# Patient Record
Sex: MALE | Race: OTHER | NOT HISPANIC OR LATINO | URBAN - METROPOLITAN AREA
[De-identification: names, ages, dates, MRNs, and addresses within clinical notes are randomized per-mention and may not be internally consistent; named-entity substitution may affect disease eponyms.]

---

## 2024-08-26 ENCOUNTER — INPATIENT (INPATIENT)
Facility: HOSPITAL | Age: 74
LOS: 1 days | Discharge: HOME | DRG: 66 | End: 2024-08-28
Attending: STUDENT IN AN ORGANIZED HEALTH CARE EDUCATION/TRAINING PROGRAM | Admitting: STUDENT IN AN ORGANIZED HEALTH CARE EDUCATION/TRAINING PROGRAM
Payer: SELF-PAY

## 2024-08-26 VITALS
SYSTOLIC BLOOD PRESSURE: 184 MMHG | HEART RATE: 80 BPM | RESPIRATION RATE: 18 BRPM | WEIGHT: 171.96 LBS | DIASTOLIC BLOOD PRESSURE: 103 MMHG | OXYGEN SATURATION: 100 % | HEIGHT: 64 IN | TEMPERATURE: 98 F

## 2024-08-26 DIAGNOSIS — I20.9 ANGINA PECTORIS, UNSPECIFIED: ICD-10-CM

## 2024-08-26 LAB
ALBUMIN SERPL ELPH-MCNC: 3.6 G/DL — SIGNIFICANT CHANGE UP (ref 3.5–5)
ALP SERPL-CCNC: 78 U/L — SIGNIFICANT CHANGE UP (ref 40–120)
ALT FLD-CCNC: 60 U/L DA — SIGNIFICANT CHANGE UP (ref 10–60)
ANION GAP SERPL CALC-SCNC: 6 MMOL/L — SIGNIFICANT CHANGE UP (ref 5–17)
AST SERPL-CCNC: 34 U/L — SIGNIFICANT CHANGE UP (ref 10–40)
BASE EXCESS BLDV CALC-SCNC: 2.8 MMOL/L — SIGNIFICANT CHANGE UP
BASOPHILS # BLD AUTO: 0.03 K/UL — SIGNIFICANT CHANGE UP (ref 0–0.2)
BASOPHILS NFR BLD AUTO: 0.6 % — SIGNIFICANT CHANGE UP (ref 0–2)
BILIRUB SERPL-MCNC: 0.6 MG/DL — SIGNIFICANT CHANGE UP (ref 0.2–1.2)
BUN SERPL-MCNC: 21 MG/DL — HIGH (ref 7–18)
CA-I SERPL-SCNC: SIGNIFICANT CHANGE UP MMOL/L (ref 1.15–1.33)
CALCIUM SERPL-MCNC: 8.5 MG/DL — SIGNIFICANT CHANGE UP (ref 8.4–10.5)
CHLORIDE SERPL-SCNC: 105 MMOL/L — SIGNIFICANT CHANGE UP (ref 96–108)
CO2 SERPL-SCNC: 26 MMOL/L — SIGNIFICANT CHANGE UP (ref 22–31)
CREAT SERPL-MCNC: 1.44 MG/DL — HIGH (ref 0.5–1.3)
EGFR: 51 ML/MIN/1.73M2 — LOW
EOSINOPHIL # BLD AUTO: 0.13 K/UL — SIGNIFICANT CHANGE UP (ref 0–0.5)
EOSINOPHIL NFR BLD AUTO: 2.5 % — SIGNIFICANT CHANGE UP (ref 0–6)
GAS PNL BLDV: 136 MMOL/L — SIGNIFICANT CHANGE UP (ref 136–145)
GAS PNL BLDV: SIGNIFICANT CHANGE UP
GLUCOSE SERPL-MCNC: 156 MG/DL — HIGH (ref 70–99)
HCO3 BLDV-SCNC: 29 MMOL/L — SIGNIFICANT CHANGE UP (ref 22–29)
HCT VFR BLD CALC: 36.3 % — LOW (ref 39–50)
HGB BLD-MCNC: 11.8 G/DL — LOW (ref 13–17)
IMM GRANULOCYTES NFR BLD AUTO: 0.2 % — SIGNIFICANT CHANGE UP (ref 0–0.9)
LACTATE BLDV-MCNC: 1.2 MMOL/L — SIGNIFICANT CHANGE UP (ref 0.5–2)
LIDOCAIN IGE QN: 47 U/L — SIGNIFICANT CHANGE UP (ref 13–75)
LYMPHOCYTES # BLD AUTO: 1.38 K/UL — SIGNIFICANT CHANGE UP (ref 1–3.3)
LYMPHOCYTES # BLD AUTO: 26.5 % — SIGNIFICANT CHANGE UP (ref 13–44)
MAGNESIUM SERPL-MCNC: 1.5 MG/DL — LOW (ref 1.6–2.6)
MCHC RBC-ENTMCNC: 27.7 PG — SIGNIFICANT CHANGE UP (ref 27–34)
MCHC RBC-ENTMCNC: 32.5 GM/DL — SIGNIFICANT CHANGE UP (ref 32–36)
MCV RBC AUTO: 85.2 FL — SIGNIFICANT CHANGE UP (ref 80–100)
MONOCYTES # BLD AUTO: 0.51 K/UL — SIGNIFICANT CHANGE UP (ref 0–0.9)
MONOCYTES NFR BLD AUTO: 9.8 % — SIGNIFICANT CHANGE UP (ref 2–14)
NEUTROPHILS # BLD AUTO: 3.14 K/UL — SIGNIFICANT CHANGE UP (ref 1.8–7.4)
NEUTROPHILS NFR BLD AUTO: 60.4 % — SIGNIFICANT CHANGE UP (ref 43–77)
NRBC # BLD: 0 /100 WBCS — SIGNIFICANT CHANGE UP (ref 0–0)
NT-PROBNP SERPL-SCNC: 31 PG/ML — SIGNIFICANT CHANGE UP (ref 0–125)
PCO2 BLDV: 52 MMHG — SIGNIFICANT CHANGE UP (ref 42–55)
PH BLDV: 7.36 — SIGNIFICANT CHANGE UP (ref 7.32–7.43)
PLATELET # BLD AUTO: 173 K/UL — SIGNIFICANT CHANGE UP (ref 150–400)
PO2 BLDV: 26 MMHG — SIGNIFICANT CHANGE UP
POTASSIUM BLDV-SCNC: 4.6 MMOL/L — SIGNIFICANT CHANGE UP (ref 3.5–5.1)
POTASSIUM SERPL-MCNC: 4.2 MMOL/L — SIGNIFICANT CHANGE UP (ref 3.5–5.3)
POTASSIUM SERPL-SCNC: 4.2 MMOL/L — SIGNIFICANT CHANGE UP (ref 3.5–5.3)
PROT SERPL-MCNC: 7.4 G/DL — SIGNIFICANT CHANGE UP (ref 6–8.3)
RBC # BLD: 4.26 M/UL — SIGNIFICANT CHANGE UP (ref 4.2–5.8)
RBC # FLD: 13 % — SIGNIFICANT CHANGE UP (ref 10.3–14.5)
SAO2 % BLDV: 41.8 % — SIGNIFICANT CHANGE UP
SODIUM SERPL-SCNC: 137 MMOL/L — SIGNIFICANT CHANGE UP (ref 135–145)
TROPONIN I, HIGH SENSITIVITY RESULT: 6 NG/L — SIGNIFICANT CHANGE UP
TROPONIN I, HIGH SENSITIVITY RESULT: 8.2 NG/L — SIGNIFICANT CHANGE UP
WBC # BLD: 5.2 K/UL — SIGNIFICANT CHANGE UP (ref 3.8–10.5)
WBC # FLD AUTO: 5.2 K/UL — SIGNIFICANT CHANGE UP (ref 3.8–10.5)

## 2024-08-26 PROCEDURE — 70547 MR ANGIOGRAPHY NECK W/O DYE: CPT | Mod: 26

## 2024-08-26 PROCEDURE — 71045 X-RAY EXAM CHEST 1 VIEW: CPT | Mod: 26

## 2024-08-26 PROCEDURE — 70551 MRI BRAIN STEM W/O DYE: CPT | Mod: 26

## 2024-08-26 PROCEDURE — 99223 1ST HOSP IP/OBS HIGH 75: CPT | Mod: GC

## 2024-08-26 PROCEDURE — 99285 EMERGENCY DEPT VISIT HI MDM: CPT

## 2024-08-26 PROCEDURE — 70450 CT HEAD/BRAIN W/O DYE: CPT | Mod: 26

## 2024-08-26 PROCEDURE — 93010 ELECTROCARDIOGRAM REPORT: CPT

## 2024-08-26 PROCEDURE — 74174 CTA ABD&PLVS W/CONTRAST: CPT | Mod: 26,MC

## 2024-08-26 PROCEDURE — 70544 MR ANGIOGRAPHY HEAD W/O DYE: CPT | Mod: 26,59

## 2024-08-26 PROCEDURE — 71275 CT ANGIOGRAPHY CHEST: CPT | Mod: 26,MC

## 2024-08-26 RX ORDER — ASPIRIN 81 MG
162 TABLET, DELAYED RELEASE (ENTERIC COATED) ORAL ONCE
Refills: 0 | Status: COMPLETED | OUTPATIENT
Start: 2024-08-26 | End: 2024-08-26

## 2024-08-26 RX ORDER — ASPIRIN 81 MG
81 TABLET, DELAYED RELEASE (ENTERIC COATED) ORAL DAILY
Refills: 0 | Status: DISCONTINUED | OUTPATIENT
Start: 2024-08-27 | End: 2024-08-28

## 2024-08-26 RX ORDER — HYDRALAZINE HCL 50 MG
10 TABLET ORAL ONCE
Refills: 0 | Status: COMPLETED | OUTPATIENT
Start: 2024-08-26 | End: 2024-08-26

## 2024-08-26 RX ADMIN — Medication 162 MILLIGRAM(S): at 13:31

## 2024-08-26 RX ADMIN — Medication 10 MILLIGRAM(S): at 21:58

## 2024-08-26 NOTE — H&P ADULT - ASSESSMENT
Patient is a 74M with PMHx of HTN, DM, GERD, hip arthritis who presents with 3 month history of chest pain on exertion. Patient noted to have new left sided facial droop in the ED. NIHSS 1. MR of head showed punctate focus in left corona radiata, consistent with an acute infarct. Admitted for acute CVA workup.

## 2024-08-26 NOTE — H&P ADULT - PROBLEM SELECTOR PLAN 2
p/w left sided chest pain with nausea   Reports chest pain on exertion for the past 3 months; chest pain last 2-3 months   trop neg x2; EKG NSR,   f/u TTE  Not a candidate for stress test or PCI for 3 months 2/2 acute stroke

## 2024-08-26 NOTE — H&P ADULT - NSHPREVIEWOFSYSTEMS_GEN_ALL_CORE
CONSTITUTIONAL: No fever, weight loss, or fatigue  EYES: No eye pain, visual disturbances, or discharge  ENT:  No difficulty hearing, tinnitus, vertigo; No sinus or throat pain  NECK: No pain or stiffness  RESPIRATORY: No cough, wheezing, chills or hemoptysis; No Shortness of Breath  CARDIOVASCULAR: No chest pain, palpitations, passing out, dizziness, or leg swelling  GASTROINTESTINAL: No abdominal or epigastric pain. No nausea, vomiting, or hematemesis; No diarrhea or constipation. No melena or hematochezia.  GENITOURINARY: No dysuria, frequency, hematuria, or incontinence  NEUROLOGICAL: No headaches, memory loss, loss of strength, numbness, or tremors  SKIN: No itching, burning, rashes, or lesions   LYMPH Nodes: No enlarged glands  ENDOCRINE: No heat or cold intolerance; No hair loss  MUSCULOSKELETAL: No joint pain or swelling; No muscle, back, or extremity pain  PSYCHIATRIC: No depression, anxiety, mood swings, or difficulty sleeping  HEME/LYMPH: No easy bruising, or bleeding gums  ALLERGY AND IMMUNOLOGIC: No hives or eczema CONSTITUTIONAL: No fever, weight loss, or fatigue  EYES: No eye pain, visual disturbances, or discharge  ENT:  No difficulty hearing, tinnitus, vertigo; No sinus or throat pain  NECK: No pain or stiffness  RESPIRATORY: No cough, wheezing, chills or hemoptysis; No Shortness of Breath  CARDIOVASCULAR: +chest pain, palpitation, dizziness; No passing out or leg swelling  GASTROINTESTINAL: +constipation; No abdominal or epigastric pain. No nausea, vomiting, or hematemesis; No diarrhea; No melena or hematochezia.  GENITOURINARY: No dysuria, frequency, hematuria, or incontinence  NEUROLOGICAL: + headache; No headaches, memory loss, loss of strength, numbness, or tremors  SKIN: No itching, burning, rashes, or lesions   LYMPH Nodes: No enlarged glands  MUSCULOSKELETAL: No joint pain or swelling; No muscle, back, or extremity pain  PSYCHIATRIC: No depression, anxiety, mood swings, or difficulty sleeping  HEME/LYMPH: No easy bruising, or bleeding gums  ALLERGY AND IMMUNOLOGIC: No hives or eczema

## 2024-08-26 NOTE — H&P ADULT - ATTENDING COMMENTS
CT Head IMPRESSION:  No intracranial mass effect, acute hemorrhage or midline shift. Punctate focus of ischemia seen on the MRI of the brain from the same day, in the left corona radiata, is not appreciated on this noncontrast CT.    MRA Head/Neck, MRI Brain IMPRESSION:  Punctate focus of restricted diffusion in the left corona radiata with associated T2 prolongation, consistent with an acute infarct. There is no acute intracranial hemorrhage.  No hemodynamically significant stenosis in the head or neck.  No intracranial aneurysm.    CTA C/A/P IMPRESSION:  No CT evidence of aortic intramural hematoma, dissection, aneurysm, pseudoaneurysm, or transection.  No acute pleural or parenchymal abnormality.  No evidence of abdominal finding.    Chest X-Ray IMPRESSION: Negative chest.    EKG: Normal Sinus Rhythm, 65 bpm, QTc 382ms, NJ 134ms, on my read no ST segment elevation or depression, no TWI    74 year old male patient with pmhx HTN, DM, GERD, Hip arthritis who presented to the ER due to worsening chest pain.  In the ER patient found to have a left sided facial droop with slurred speech. MRI/MRA found an acute stroke. Patient states hi slurred speech has improved, but he still has a left sided facial droop. The patient states he came to [place] last Saturday from [facility]. He has been having chest tightness on and off with associated shortness of breath for the past 3 months worsened last night. The chest tightness lasts 2 to 3 hours at a time and is worse with taking a deep breath. He denies ever having had a stress test, but he thinks he had an ECHO 9 months ago which he thinks was negative.  Review Of Systems included: + chest pain/tightness, + chest pain/tightness on exertion, + shortness of breath, + toe numbness of all 10 toes for 6 weeks, + slurred speech (improved), + left facial droop, + mild dizziness, + imbalance, + headache, + dysuria, + throat tickles, + dry cough, no fever, no chills, no toe coldness, no falls, no loss of consciousness, no lightheadedness, no palpitations, no heartburn, no blurry vision, no facial numbness    General: Awake, Alert, Oriented   Cardiac: RRR, chest non-tender on palpation  Pulmonary: CTA b/l  Abdominal: Soft, ND, NT  Neuro: CN II-VI and VIII-XII intact except for left facial droop, Muscle strength +5/5 in upper and lower extremities b/l, Sensation intact except for decreased sensation in all toes bilaterally which patient states is chronic for 6 weeks, NIHSS of 1 (left facial droop 1 pt)  Extremities: No edema b/l    # Stroke  > NIHSS of 1  - Consult Neurology  - Cardiac Telemetry Monitoring  - ECHO  - Lipid Panel  - A1C  - Aspirin  - Plavix  - Atorvastatin  - Neuro-checks  - Permissive Hypertension for 24-48 hours    # Chest Pain  - Cardiac Telemetry Monitoring  - Consult Cardiology  - ECHO  - A1C  - Lipid Panel  - Aspirin  - Statin  - Follow up repeat Troponin  - Tylenol prn for pain    # Hx of DM  - Insulin Sliding Scale  - Blood Glucose Monitoring  - Can monitor blood glucose for 24 hours before starting long acting insulin if needed    # Hx of HTN, GERD  - Continue home medication PPI  - Hold antihypertensive medications for permissive hypertension    # DVT PPx: Lovenox    # FEN  - Diabetic DASH Diet  - Monitor and replete electrolytes as needed    Patient case and management was discussed with ER Attending. I did examine all labs (including CBC, Troponin, CMP, Lipase, Mg, pro-BNP, VBG), imaging, prior notes CT Head IMPRESSION:  No intracranial mass effect, acute hemorrhage or midline shift. Punctate focus of ischemia seen on the MRI of the brain from the same day, in the left corona radiata, is not appreciated on this noncontrast CT.    MRA Head/Neck, MRI Brain IMPRESSION:  Punctate focus of restricted diffusion in the left corona radiata with associated T2 prolongation, consistent with an acute infarct. There is no acute intracranial hemorrhage.  No hemodynamically significant stenosis in the head or neck.  No intracranial aneurysm.    CTA C/A/P IMPRESSION:  No CT evidence of aortic intramural hematoma, dissection, aneurysm, pseudoaneurysm, or transection.  No acute pleural or parenchymal abnormality.  No evidence of abdominal finding.    Chest X-Ray IMPRESSION: Negative chest.    EKG: Normal Sinus Rhythm, 65 bpm, QTc 382ms, SD 134ms, on my read no ST segment elevation or depression, no TWI    74 year old male patient with pmhx HTN, DM, GERD, Hip arthritis who presented to the ER due to worsening chest pain.  In the ER patient found to have a left sided facial droop with slurred speech. MRI/MRA found an acute stroke. Patient states hi slurred speech has improved, but he still has a left sided facial droop. The patient states he came to [place] last Saturday from [facility]. He has been having chest tightness on and off with associated shortness of breath for the past 3 months worsened last night. The chest tightness lasts 2 to 3 hours at a time and is worse with taking a deep breath. He denies ever having had a stress test, but he thinks he had an ECHO 9 months ago which he thinks was negative.  Review Of Systems included: + chest pain/tightness, + chest pain/tightness on exertion, + shortness of breath, + toe numbness of all 10 toes for 6 weeks, + slurred speech (improved), + left facial droop, + mild dizziness, + imbalance, + headache, + dysuria, + throat tickles, + dry cough, no fever, no chills, no toe coldness, no falls, no loss of consciousness, no lightheadedness, no palpitations, no heartburn, no blurry vision, no facial numbness    General: Awake, Alert, Oriented   Cardiac: RRR, chest non-tender on palpation  Pulmonary: CTA b/l  Abdominal: Soft, ND, NT  Neuro: CN II-VI and VIII-XII intact except for left facial droop, Muscle strength +5/5 in upper and lower extremities b/l, Sensation intact except for decreased sensation in all toes bilaterally which patient states is chronic for 6 weeks, NIHSS of 1 (left facial droop 1 pt)  Extremities: No edema b/l    # Stroke  > NIHSS of 1  - Consult Neurology  - Cardiac Telemetry Monitoring  - ECHO  - Lipid Panel  - A1C  - Aspirin  - Plavix  - Atorvastatin  - Neuro-checks  - Permissive Hypertension for 24-48 hours    # Chest Pain  - Cardiac Telemetry Monitoring  - Consult Cardiology  - ECHO  - A1C  - Lipid Panel  - Aspirin  - Statin  - Follow up repeat Troponin  - Tylenol prn for pain    # Hx of DM  - Insulin Sliding Scale  - Blood Glucose Monitoring  - Can monitor blood glucose for 24 hours before starting long acting insulin if needed    # Hx of HTN, GERD  - Continue home medication PPI  - Hold antihypertensive medications for permissive hypertension    # DVT PPx: Lovenox    # FEN  - Diabetic DASH Diet  - Monitor and replete electrolytes as needed    Patient case and management was discussed with ER Attending. I did examine all labs (including CBC, Troponin, CMP, Lipase, Mg, pro-BNP, VBG), imaging, prior notes    Time-based billing (NON-critical care).  76 minutes spent on total encounter excluding any time spent teaching residents. The necessity of the time spent during the encounter on this date of service was due to: Review of records, vital signs, labs, microbiology, and imaging, Ordering labs and/or tests, General physical examination performed, Generation of treatment plan, Counseling of the patient and/or family members CT Head IMPRESSION:  No intracranial mass effect, acute hemorrhage or midline shift. Punctate focus of ischemia seen on the MRI of the brain from the same day, in the left corona radiata, is not appreciated on this noncontrast CT.    MRA Head/Neck, MRI Brain IMPRESSION:  Punctate focus of restricted diffusion in the left corona radiata with associated T2 prolongation, consistent with an acute infarct. There is no acute intracranial hemorrhage.  No hemodynamically significant stenosis in the head or neck.  No intracranial aneurysm.    CTA C/A/P IMPRESSION:  No CT evidence of aortic intramural hematoma, dissection, aneurysm, pseudoaneurysm, or transection.  No acute pleural or parenchymal abnormality.  No evidence of abdominal finding.    Chest X-Ray IMPRESSION: Negative chest.    EKG: Normal Sinus Rhythm, 65 bpm, QTc 382ms, VT 134ms, on my read no ST segment elevation or depression, no TWI    74 year old male patient with pmhx HTN, DM, GERD, Hip arthritis who presented to the ER due to worsening chest pain.  In the ER patient found to have a left sided facial droop with slurred speech. MRI/MRA found an acute stroke. Patient states hi slurred speech has improved, but he still has a left sided facial droop. The patient states he came to [place] last Saturday from [facility]. He has been having chest tightness on and off with associated shortness of breath for the past 3 months worsened last night. The chest tightness lasts 2 to 3 hours at a time and is worse with taking a deep breath. He denies ever having had a stress test, but he thinks he had an ECHO 9 months ago which he thinks was negative.  Review Of Systems included: + chest pain/tightness, + chest pain/tightness on exertion, + shortness of breath, + toe numbness of all 10 toes for 6 weeks, + slurred speech (improved), + left facial droop, + mild dizziness, + imbalance, + headache, + dysuria, + throat tickles, + dry cough, no fever, no chills, no toe coldness, no falls, no loss of consciousness, no lightheadedness, no palpitations, no heartburn, no blurry vision, no facial numbness    General: Awake, Alert, Oriented   Cardiac: RRR, chest non-tender on palpation  Pulmonary: CTA b/l  Abdominal: Soft, ND, NT  Neuro: CN II-VI and VIII-XII intact except for left facial droop, Muscle strength +5/5 in upper and lower extremities b/l, Sensation intact except for decreased sensation in all toes bilaterally which patient states is chronic for 6 weeks, NIHSS of 1 (left facial droop 1 pt)  Extremities: No edema b/l    # Stroke  > NIHSS of 1  - Consult Neurology  - Cardiac Telemetry Monitoring  - ECHO  - Lipid Panel  - A1C  - Aspirin  - Plavix  - Atorvastatin  - Neuro-checks  - Permissive Hypertension for 24-48 hours    # Chest Pain  > Wells Score of zero; PE unlikely  - Cardiac Telemetry Monitoring  - Consult Cardiology  - ECHO  - A1C  - Lipid Panel  - Aspirin  - Statin  - Follow up repeat Troponin  - Tylenol prn for pain    # Hx of DM  - Insulin Sliding Scale  - Blood Glucose Monitoring  - Can monitor blood glucose for 24 hours before starting long acting insulin if needed    # Hx of HTN, GERD  - Continue home medication PPI  - Hold antihypertensive medications for permissive hypertension    # DVT PPx: Lovenox    # FEN  - Diabetic DASH Diet  - Monitor and replete electrolytes as needed    Patient case and management was discussed with ER Attending. I did examine all labs (including CBC, Troponin, CMP, Lipase, Mg, pro-BNP, VBG), imaging, prior notes    Time-based billing (NON-critical care).  76 minutes spent on total encounter excluding any time spent teaching residents. The necessity of the time spent during the encounter on this date of service was due to: Review of records, vital signs, labs, microbiology, and imaging, Ordering labs and/or tests, General physical examination performed, Generation of treatment plan, Counseling of the patient and/or family members

## 2024-08-26 NOTE — ED PROVIDER NOTE - PHYSICAL EXAMINATION
General: non-toxic, uncomfortable  HEENT: NCAT, PERRL, no conjunctival pallor   Cardiac: RRR, no murmurs, 2+ peripheral pulses  Resp: CTAB  Abdomen: soft, non-distended, bowel sounds present, no ttp, no rebound or guarding. no organomegaly  Extremities: no peripheral edema, calf tenderness, or leg size discrepancies  Skin: no rashes, no diaphoresis  Neuro: AAOx4, 5+motor, sensation grossly intact  Psych: mood and affect appropriate

## 2024-08-26 NOTE — H&P ADULT - HISTORY OF PRESENT ILLNESS
Patient is a 74M with PMHx of HTN, DM, GERD, hip arthritis who presents with 3 month history of chest pain on exertion. Patient is visiting his daughter for [facility] and arrived to [place] last Saturday. Patient has been having intermittent chest tightness with exertion for the past 3 months. The episodes last 1-3 hours and improves with rest. During these episodes, patient reports palpitations and dizziness but denies SOB, arm pain or numbness. Patient denies pleuritic chest pain, and is not tender to touch. Patient has never sustained a fall or syncopal event. Six weeks ago patient also started having numbness on all 10 of his toes and was told by his PCP that it was likely 2/2 his diabetes. Patient has not seen a cardiologist for the chest pain. He regularly gets an EKG and his last TTE 9 months ago was normal. Patient never had a stress test before. 3 days ago he had an episode of R. neck numbness and pain. Patient denies nausea, vomiting, fever, chills, abdominal pain, shooting pain down the leg.     While in the ED, patient was noted to have a new left facial droop. Per the daughter, patient's speech was unclear at the time. Patient denies facial numbness or enunciating difficulties but did endorse feeling off balance when trying to walk. Speech has improved to near baseline at the time of exam. Patient endorses headache but denies any neurological symptoms including arm or leg weakness, vision changes, focal numbness. Patient reports that he was on cholesterol medication but his recent cholesterol was 174 so he has been intermittently taking his meds. Last A1C was 6.7. Patient denies any family history of stroke or MI.

## 2024-08-26 NOTE — ED PROVIDER NOTE - CLINICAL SUMMARY MEDICAL DECISION MAKING FREE TEXT BOX
74-year-old male with history of hyperlipidemia, non-insulin-dependent diabetes presents after a flight from [facility] for substernal chest pain increased with deep breathing and walking with associated shortness of breath.  No associated fever, cough, vomiting, leg swelling orthopnea.  Pain is not radicular to the back jaw or arms.  Feels different from his typical GERD.  On exam no conjunctival pallor, clear lungs, no evidence of DVT, benign abdomen.  Concerning presentation will need ACS workup, PE rule out given pleurisy and onset after a long flight.  Initial EKG with normal sinus rhythm T wave flattening in leads III and V3.  No prior EKGs for comparison.  Patient advised to advise ED team if pain worsens for serial ECGs.  Aspirin and admission.

## 2024-08-26 NOTE — ED ADULT NURSE NOTE - CHIEF COMPLAINT QUOTE
Mid sternal chest pain and sob on and off x 3 months ,increased pain since last night ,also reports all toes numbness x 3 months ,just arrived from [facility] 2 days ago

## 2024-08-26 NOTE — CHART NOTE - NSCHARTNOTEFT_GEN_A_CORE
Called for admission.  Patient has left sided weakness and left facial droop  Received IV contrast earlier today, cannot receive further contrast for stroke.,  Discussed with MRI, can go for urgent MRI.  Consent form signed, to go for MRA head and neck with MR head  Admission to medicine is contingent on findings Called for admission.  Patient has left sided weakness and left facial droop  Received IV contrast earlier today, cannot receive further contrast for stroke.,  Discussed with MRI, can go for urgent MRI.  Consent form signed, to go for MRA head and neck with MR head  Admission to medicine is contingent on findings    MR with acute infarct, no significant stenosis  admit to telemetry

## 2024-08-26 NOTE — ED ADULT NURSE NOTE - OBJECTIVE STATEMENT
Patient c/o worsening non-radiating chest pain and tingling of bilateral feet. Pt denies any dizziness, sob, fever/chills and n/v/d.

## 2024-08-26 NOTE — H&P ADULT - NSHPPHYSICALEXAM_GEN_ALL_CORE
Vital Signs Last 24 Hrs  T(C): 36.6 (26 Aug 2024 21:30), Max: 36.9 (26 Aug 2024 11:43)  T(F): 97.8 (26 Aug 2024 21:30), Max: 98.5 (26 Aug 2024 11:43)  HR: 66 (26 Aug 2024 21:30) (64 - 80)  BP: 157/81 (26 Aug 2024 21:30) (157/81 - 184/103)  BP(mean): --  RR: 18 (26 Aug 2024 21:30) (18 - 20)  SpO2: 99% (26 Aug 2024 21:30) (99% - 100%)    Parameters below as of 26 Aug 2024 21:30  Patient On (Oxygen Delivery Method): room air    GENERAL: NAD, well-groomed, well-developed  HEAD:  Atraumatic, Normocephalic  EYES: EOMI, PERRLA, conjunctiva and sclera clear  ENMT: No tonsillar erythema, exudates, or enlargement; Moist mucous membranes, No lesions  NECK: Supple, normal appearance, No JVD; Normal thyroid; Trachea midline  NERVOUS SYSTEM:  Alert & Oriented X3,  Motor Strength 5/5 B/L upper and lower extremities, sensation intact, CN II-XII intact.   CHEST/LUNG: Lungs clear to auscultation bilaterally, No rales, rhonchi, wheezing   HEART: Regular rate and rhythm; No murmurs, rubs, or gallops  ABDOMEN: Soft, Nontender, Nondistended; Bowel sounds present  : No suprapubic tenderness, CVAT; Fragoso (+/-)  EXTREMITIES:  2+ Peripheral Pulses, No clubbing, cyanosis, or edema  LYMPH: No lymphadenopathy noted  SKIN: No rashes or lesions;  Good capillary refill Vital Signs Last 24 Hrs  T(C): 36.6 (26 Aug 2024 21:30), Max: 36.9 (26 Aug 2024 11:43)  T(F): 97.8 (26 Aug 2024 21:30), Max: 98.5 (26 Aug 2024 11:43)  HR: 66 (26 Aug 2024 21:30) (64 - 80)  BP: 157/81 (26 Aug 2024 21:30) (157/81 - 184/103)  BP(mean): --  RR: 18 (26 Aug 2024 21:30) (18 - 20)  SpO2: 99% (26 Aug 2024 21:30) (99% - 100%)    Parameters below as of 26 Aug 2024 21:30  Patient On (Oxygen Delivery Method): room air    GENERAL: NAD, laying comfortably in bed   HEAD:  Atraumatic, Normocephalic  EYES: EOMI, PERRLA, conjunctiva and sclera clear  ENMT: No tonsillar erythema, exudates, or enlargement; Moist mucous membranes, No lesions  NECK: Supple, normal appearance, No JVD; Normal thyroid; Trachea midline  NERVOUS SYSTEM:  Alert & Oriented X3,  Motor Strength 5/5 B/L upper and lower extremities, sensation intact, CN II-VI, VIII-XII intact. Left facial droop, No upper facial motor deficit,   CHEST/LUNG: Lungs clear to auscultation bilaterally, No rales, rhonchi, wheezing   HEART: Regular rate and rhythm; LUSB systolic murmur; No rubs, or gallops  ABDOMEN: Soft, Nontender, Nondistended; Bowel sounds present  : No suprapubic tenderness, CVAT;  EXTREMITIES:  2+ Peripheral Pulses, No clubbing, cyanosis, or edema  LYMPH: No lymphadenopathy noted  SKIN: No rashes or lesions;  Good capillary refill

## 2024-08-26 NOTE — H&P ADULT - PROBLEM SELECTOR PLAN 1
MR of head: Punctate focus in left corona radiata, consistent with an acute infarct. No acute intracranial hemorrhage.  EKG NSR  s/p asprin 162mg in ED   Admit to tele   Passed dysphagia   c/w aspirin 81mg, plavix 75mg atorvastatin 80mg   f/u A1C, lipid panel, TTE w/ bubble  Neurology Dr. Carranza consulted  f/u PT eval

## 2024-08-26 NOTE — H&P ADULT - PROBLEM SELECTOR PLAN 4
Hx of T2DM on metformin 500mg TID w/ peripheral neuropathy   Holding oral DM meds   will start low dose ISS AC QHS   f/u A1C

## 2024-08-26 NOTE — H&P ADULT - PROBLEM SELECTOR PLAN 3
Hx of HTN reports ramipril 5mg qd PRN  Reports taking 5mg as needed, usually doesn't take it every day as patient gets hypotensive (dizziness)  c/w home meds Hx of HTN reports ramipril 5mg qd PRN  Reports taking 5mg as needed, usually doesn't take it every day as patient gets hypotensive (dizziness)  Permissive HTN for 24-28h

## 2024-08-26 NOTE — ED PROVIDER NOTE - PROGRESS NOTE DETAILS
Shaina Espino (Rodriguez),  called to bedside for concern of new facial droop. mild downward deviation of mouth, not previously present. no pronator drift. extremities still with 5/5 strength. given cp and new neuro finding with pleurisy will change imaging to aorta study and expedite. Shaina Espino DO (Rodriguez) accepted to hosp service requesting. [facility] Shaina Espino (Rodriguez), DO received call from rads that CTH will have residual contrast from angio, and likely will not be a useful study. hospitalist requesting admission be reversed at this time and hold for MRI. attempting to reach radiology regarding best next step. pt still with L facial droop, talking with full strength. Shaina Espino (Rodriguez), DO infarct not amenable to thrombectomy. accepted to hospitalist service. pending callback from neuro. Shaina Espino (Rodriguez), DO neuro consulted. recommending plavix and BP within normal limits. will give hydralazine to lower from the current 157, per neuro recommendations since this is in an area of concern for hypertensive stroke.

## 2024-08-27 DIAGNOSIS — Z98.890 OTHER SPECIFIED POSTPROCEDURAL STATES: Chronic | ICD-10-CM

## 2024-08-27 DIAGNOSIS — Z29.9 ENCOUNTER FOR PROPHYLACTIC MEASURES, UNSPECIFIED: ICD-10-CM

## 2024-08-27 DIAGNOSIS — R07.9 CHEST PAIN, UNSPECIFIED: ICD-10-CM

## 2024-08-27 DIAGNOSIS — K21.9 GASTRO-ESOPHAGEAL REFLUX DISEASE WITHOUT ESOPHAGITIS: ICD-10-CM

## 2024-08-27 DIAGNOSIS — I10 ESSENTIAL (PRIMARY) HYPERTENSION: ICD-10-CM

## 2024-08-27 DIAGNOSIS — I63.9 CEREBRAL INFARCTION, UNSPECIFIED: ICD-10-CM

## 2024-08-27 DIAGNOSIS — E11.9 TYPE 2 DIABETES MELLITUS WITHOUT COMPLICATIONS: ICD-10-CM

## 2024-08-27 LAB
A1C WITH ESTIMATED AVERAGE GLUCOSE RESULT: 7.4 % — HIGH (ref 4–5.6)
ANION GAP SERPL CALC-SCNC: 7 MMOL/L — SIGNIFICANT CHANGE UP (ref 5–17)
BUN SERPL-MCNC: 22 MG/DL — HIGH (ref 7–18)
CALCIUM SERPL-MCNC: 9.6 MG/DL — SIGNIFICANT CHANGE UP (ref 8.4–10.5)
CHLORIDE SERPL-SCNC: 105 MMOL/L — SIGNIFICANT CHANGE UP (ref 96–108)
CHOLEST SERPL-MCNC: 160 MG/DL — SIGNIFICANT CHANGE UP
CO2 SERPL-SCNC: 27 MMOL/L — SIGNIFICANT CHANGE UP (ref 22–31)
CREAT SERPL-MCNC: 1.37 MG/DL — HIGH (ref 0.5–1.3)
EGFR: 54 ML/MIN/1.73M2 — LOW
ESTIMATED AVERAGE GLUCOSE: 166 MG/DL — HIGH (ref 68–114)
GLUCOSE BLDC GLUCOMTR-MCNC: 130 MG/DL — HIGH (ref 70–99)
GLUCOSE BLDC GLUCOMTR-MCNC: 137 MG/DL — HIGH (ref 70–99)
GLUCOSE BLDC GLUCOMTR-MCNC: 140 MG/DL — HIGH (ref 70–99)
GLUCOSE BLDC GLUCOMTR-MCNC: 152 MG/DL — HIGH (ref 70–99)
GLUCOSE BLDC GLUCOMTR-MCNC: 169 MG/DL — HIGH (ref 70–99)
GLUCOSE SERPL-MCNC: 148 MG/DL — HIGH (ref 70–99)
HCT VFR BLD CALC: 37.1 % — LOW (ref 39–50)
HDLC SERPL-MCNC: 49 MG/DL — SIGNIFICANT CHANGE UP
HGB BLD-MCNC: 12.1 G/DL — LOW (ref 13–17)
LIPID PNL WITH DIRECT LDL SERPL: 89 MG/DL — SIGNIFICANT CHANGE UP
MAGNESIUM SERPL-MCNC: 1.8 MG/DL — SIGNIFICANT CHANGE UP (ref 1.6–2.6)
MCHC RBC-ENTMCNC: 27.9 PG — SIGNIFICANT CHANGE UP (ref 27–34)
MCHC RBC-ENTMCNC: 32.6 GM/DL — SIGNIFICANT CHANGE UP (ref 32–36)
MCV RBC AUTO: 85.7 FL — SIGNIFICANT CHANGE UP (ref 80–100)
NON HDL CHOLESTEROL: 111 MG/DL — SIGNIFICANT CHANGE UP
NRBC # BLD: 0 /100 WBCS — SIGNIFICANT CHANGE UP (ref 0–0)
PHOSPHATE SERPL-MCNC: 3.2 MG/DL — SIGNIFICANT CHANGE UP (ref 2.5–4.5)
PLATELET # BLD AUTO: 173 K/UL — SIGNIFICANT CHANGE UP (ref 150–400)
POTASSIUM SERPL-MCNC: 4.4 MMOL/L — SIGNIFICANT CHANGE UP (ref 3.5–5.3)
POTASSIUM SERPL-SCNC: 4.4 MMOL/L — SIGNIFICANT CHANGE UP (ref 3.5–5.3)
RBC # BLD: 4.33 M/UL — SIGNIFICANT CHANGE UP (ref 4.2–5.8)
RBC # FLD: 13.2 % — SIGNIFICANT CHANGE UP (ref 10.3–14.5)
SODIUM SERPL-SCNC: 139 MMOL/L — SIGNIFICANT CHANGE UP (ref 135–145)
TRIGL SERPL-MCNC: 111 MG/DL — SIGNIFICANT CHANGE UP
WBC # BLD: 4.93 K/UL — SIGNIFICANT CHANGE UP (ref 3.8–10.5)
WBC # FLD AUTO: 4.93 K/UL — SIGNIFICANT CHANGE UP (ref 3.8–10.5)

## 2024-08-27 PROCEDURE — 99233 SBSQ HOSP IP/OBS HIGH 50: CPT | Mod: GC

## 2024-08-27 PROCEDURE — 99255 IP/OBS CONSLTJ NEW/EST HI 80: CPT

## 2024-08-27 RX ORDER — POLYETHYLENE GLYCOL 3350 17 G/17G
17 POWDER, FOR SOLUTION ORAL DAILY
Refills: 0 | Status: DISCONTINUED | OUTPATIENT
Start: 2024-08-27 | End: 2024-08-28

## 2024-08-27 RX ORDER — SENNA 187 MG
2 TABLET ORAL AT BEDTIME
Refills: 0 | Status: DISCONTINUED | OUTPATIENT
Start: 2024-08-27 | End: 2024-08-28

## 2024-08-27 RX ORDER — METFORMIN HYDROCHLORIDE 850 MG/1
1 TABLET, FILM COATED ORAL
Refills: 0 | DISCHARGE

## 2024-08-27 RX ORDER — OMEPRAZOLE 40 MG/1
1 CAPSULE, DELAYED RELEASE ORAL
Refills: 0 | DISCHARGE

## 2024-08-27 RX ORDER — PANTOPRAZOLE SODIUM 40 MG
40 TABLET, DELAYED RELEASE (ENTERIC COATED) ORAL
Refills: 0 | Status: DISCONTINUED | OUTPATIENT
Start: 2024-08-27 | End: 2024-08-28

## 2024-08-27 RX ORDER — ENOXAPARIN SODIUM 100 MG/ML
40 INJECTION SUBCUTANEOUS EVERY 24 HOURS
Refills: 0 | Status: DISCONTINUED | OUTPATIENT
Start: 2024-08-27 | End: 2024-08-28

## 2024-08-27 RX ADMIN — Medication 5 MILLIGRAM(S): at 02:57

## 2024-08-27 RX ADMIN — ENOXAPARIN SODIUM 40 MILLIGRAM(S): 100 INJECTION SUBCUTANEOUS at 05:43

## 2024-08-27 RX ADMIN — Medication 2 TABLET(S): at 21:33

## 2024-08-27 RX ADMIN — Medication 5 MILLIGRAM(S): at 21:34

## 2024-08-27 RX ADMIN — Medication 75 MILLIGRAM(S): at 12:00

## 2024-08-27 RX ADMIN — Medication 80 MILLIGRAM(S): at 21:33

## 2024-08-27 RX ADMIN — POLYETHYLENE GLYCOL 3350 17 GRAM(S): 17 POWDER, FOR SOLUTION ORAL at 02:58

## 2024-08-27 RX ADMIN — Medication 1: at 17:54

## 2024-08-27 RX ADMIN — Medication 1: at 12:00

## 2024-08-27 RX ADMIN — Medication 40 MILLIGRAM(S): at 05:43

## 2024-08-27 RX ADMIN — Medication 81 MILLIGRAM(S): at 12:01

## 2024-08-27 NOTE — PROGRESS NOTE ADULT - PROBLEM SELECTOR PLAN 3
Hx of HTN reports ramipril 5mg qd PRN  Reports taking 5mg as needed, usually doesn't take it every day as patient gets hypotensive (dizziness)  Permissive HTN for 24-28h Hx of HTN reports ramipril 5mg qd PRN  Pt currently mildly hypertensive  Consider restarting Ramipril 5mg for outpatient

## 2024-08-27 NOTE — PROGRESS NOTE ADULT - PROBLEM SELECTOR PLAN 1
MR of head: Punctate focus in left corona radiata, consistent with an acute infarct. No acute intracranial hemorrhage.  EKG NSR  s/p asprin 162mg in ED   Admit to tele   Passed dysphagia   c/w aspirin 81mg, plavix 75mg atorvastatin 80mg   f/u A1C, lipid panel, TTE w/ bubble  Neurology Dr. Carranza consulted  f/u PT eval MR of head: Punctate focus in left corona radiata, consistent with an acute infarct. No acute intracranial hemorrhage.  EKG NSR  s/p asprin 162mg in ED   Admit to tele   Passed dysphagia   c/w Aspirin 81mg, Plavix 75mg, Atorvastatin 80mg   A1c: 7.4  Lipid panel wnl  TTE w/ bubble completed, awaiting results   Neurology Dr. Carranza consulted, awaiting recommendations  f/u PT eval

## 2024-08-27 NOTE — PHYSICAL THERAPY INITIAL EVALUATION ADULT - ACTIVE RANGE OF MOTION EXAMINATION, REHAB EVAL
full AROM on all extremities/bilateral upper extremity Active ROM was WFL (within functional limits)/bilateral  lower extremity Active ROM was WFL (within functional limits)

## 2024-08-27 NOTE — PROGRESS NOTE ADULT - ATTENDING COMMENTS
74 year old male patient with pmhx HTN, DM, GERD, Hip arthritis who presented to the ER due to worsening chest pain.  In the ER patient found to have a left sided facial droop with slurred speech. MRI/MRA found an acute stroke. Patient states hi slurred speech has improved, but he still has a left sided facial droop. The patient states he came to [place] last Saturday from [facility]. He has been having chest tightness on and off with associated shortness of breath for the past 3 months worsened last night. The chest tightness lasts 2 to 3 hours at a time and is worse with taking a deep breath. He denies ever having had a stress test, but he thinks he had an ECHO 9 months ago which he thinks was negative.    # Stroke  # Chest Pain  # Hx of DM  # HTN, GERD  NIHSS of 1 on admission. Wells Score of zero; PE unlikely. Troponin neg x 2  - Aspirin/Plavix/Atorvastatin  - f/u neurology recs  - ECHO - f/u read  - Cardiac Telemetry Monitoring  - Lipid Panel/A1C  - Monitor BP - if continues to be hypertensive will restart ramipril  - Tylenol prn for pain  - Insulin Sliding Scale  - Blood Glucose Monitoring  - Continue home medication PPI  - DVT ppx

## 2024-08-27 NOTE — PHYSICAL THERAPY INITIAL EVALUATION ADULT - LIVES WITH, PROFILE
currently visiting from [facility]; lives with her daughter in a private house with few steps to enter the house; house in [facility] is private with multiple steps to 2nd floor

## 2024-08-27 NOTE — PROGRESS NOTE ADULT - PROBLEM SELECTOR PLAN 4
Hx of T2DM on metformin 500mg TID w/ peripheral neuropathy   Holding oral DM meds   will start low dose ISS AC QHS   f/u A1C Hx of T2DM on metformin 500mg TID w/ peripheral neuropathy   Holding oral DM meds   will start low dose ISS AC QHS   A1C 7.4

## 2024-08-27 NOTE — PATIENT PROFILE ADULT - FUNCTIONAL ASSESSMENT - DAILY ACTIVITY ASSESSMENT TYPE
Really still feeling well and other than more cramping and tightening and more discomfort is really doing fine  No swelling, no HAs or vision changes  No VB or LOF. Still a lot of FM   with high risk from meds for IBD. She is off work now until delivery  Cervix is still just a tight FT and thick. Lower at -2 station rather than -3 station as before  Discussed induction with cervical ripening. Last time had almost nothing but nausea and cramping on arrival after >24 hrs prodromal labor and was found to be 7cm on admit. Doesn't want to risk taht again since didn't have much pain until actively getting her epidural. Really thinking she'll just have one this time b/c worked great for her. Had considered without but not sure why she wouldn't  Cervix is not yet favorable so ripening would be best. She is a multip who did have almost no pain until transition so induction would be best for that and for covid-19 and with her 's health to have it controlled and childcare  Reviewed policy, visitor, belongings, stay  Would likely jsut do cervidil overnight but could consider cytotec instead  Scheduled for 1930 on 4/26 with induction on 4/27 with me   Admission

## 2024-08-27 NOTE — PHYSICAL THERAPY INITIAL EVALUATION ADULT - PERTINENT HX OF CURRENT PROBLEM, REHAB EVAL
Admitted for Acute CVA; MRI and CT imaging on 8/26/24 revealed: "Punctate focus of restricted diffusion in the left corona radiata with associated T2 prolongation, consistent with an acute infarct. There is no acute intracranial hemorrhage."  PMHx: HTN, DM, GERD, hip arthritis who presents with 3 month history of chest pain on exertion

## 2024-08-27 NOTE — PATIENT PROFILE ADULT - FUNCTIONAL ASSESSMENT - BASIC MOBILITY SCORE.
OT DAILY TREATMENT NOTE  3-16    Patient Name: Natalie Berkowitz  Date:3/16/2022  : 1960  [x]  Patient  Verified  Payor: Norma Tyson / Plan: Roselia Hinds / Product Type: Commerical /    In time: 147  Out time: 230  Total Treatment Time (min): 37    Visit #: 11    Treatment Area: Cerebrovascular disease, unspecified [I67.9]  Muscle weakness (generalized) [M62.81]  Type 2 diabetes mellitus with hyperglycemia [E11.65]  Essential (primary) hypertension [I10]    SUBJECTIVE  Pain Level (0-10 scale): 0/10  Any medication changes, allergies to medications, adverse drug reactions, diagnosis change, or new procedure performed?: [x] No    [] Yes (see summary sheet for update)  Subjective functional status/changes:   [x] No changes reported  \"This really helps my brain\" states patient during attention activity    OBJECTIVE    10 min Therapeutic Exercise:  [x] See flow sheet :   Rationale: increase strength to improve the patients ability to lift objects safely and independently    33 min Therapeutic Activity:  [x]  See flow sheet :   Rationale: improve coordination and increase cognitive and visual skills  to improve the patients ability to engage independently in ADL, IADL, and work tasks    With   [] TE   [x] TA   [] neuro   [] other: Patient Education: [] Review HEP    [] Progressed/Changed HEP based on:   [] positioning   [] body mechanics   [] transfers   [] heat/ice application   [] Splint wear/care   [] Sensory re-education   [] scar management      [x] other: attention, memory, concentration           Pain Level (0-10 scale) post treatment: 0/10    ASSESSMENT/Changes in Function: Patient tolerance to treatment:  [] poor  [] fair  [x] good  []  excellent  Patient demonstrates decreased attention/concentration/memory when engaging in divided attention task/multi tasking. Patient aware of deficits and will ask for clarification or assistance as needed.   Patient demonstrates decreased visual skills--decreased scanning skills noted while looking at reading materials. Again, patient is aware of her deficits. Patient improving overall with endurance/activity tolerance. Patient will continue to benefit from skilled OT services to modify and progress therapeutic interventions, address ROM deficits, address strength deficits, analyze and address soft tissue restrictions and address visual, cognitive, and balance limitations to attain remaining goals.      [x]  See Plan of Care  []  See progress note/recertification  []  See Discharge Summary         Progress towards goals / Updated goals:  Short Term Goals: To be accomplished in 10 treatments:              0.  Patient will be mod independent with home exercise program to promote gains between sessions                          [x]???????? Met []???????? Not met []???????? Partially met               2.  Patient will demonstrate left pinch strength that is WFL's for her age range in order to use the left hand effectively during ADL tasks-at/near low end of bell curve for her age                           []???????? Met []???????? Not met [x]???????? Partially met               3.  Patient will be able to engage in 8 + minutes of sustained standing activities to promote increased independence with IADLs                          [x]???????? Met []???????? Not met []???????? Partially met      Long Term Goals: To be accomplished in 24 treatments:              1.  Patient will be able to sleep on her left side with minimal pain/difficulty--progressing                          []???????? Met []???????? Not met [x]???????? Partially met               2.  Patient will be able to write information/fill out forms in a timely and legible manner                          []???????? Met []???????? Not met [x]???????? Partially met               3.  Patient will be independent with cutting food                          []???????? Met []???????? Not met [x]???????? Partially met Joshua Sibley will be independent with completing all BADLs--progressing/increased time needed to complete tasks                          []???????? Met []???????? Not met [x]???????? Partially met     PLAN  [x]  Upgrade activities as tolerated     [x]  Continue plan of care  []  Update interventions per flow sheet       []  Discharge due to:_  []  Other:_      CHITO Fitzpatrick/L 3/16/2022 24

## 2024-08-27 NOTE — PHYSICAL THERAPY INITIAL EVALUATION ADULT - GENERAL OBSERVATIONS, REHAB EVAL
awake, alert, NAD; + peripheral IV access on right forearm; mild left facial droop; mild tongue deviation to left; symmetrical strength, movement, and sensation on both upper and lower extremities; (-) limb/extremity drift noted

## 2024-08-27 NOTE — PHYSICAL THERAPY INITIAL EVALUATION ADULT - PASSIVE RANGE OF MOTION EXAMINATION, REHAB EVAL
full PROM on all extremities/bilateral upper extremity Passive ROM was WFL (within functional limits)/bilateral lower extremity Passive ROM was WFL (within functional limits)

## 2024-08-27 NOTE — PHYSICAL THERAPY INITIAL EVALUATION ADULT - DIAGNOSIS, PT EVAL
none; patient currently appears asymptomatic except for mild left lower quadrant facial droop and mild tongue deviation to left; symmetrical strength and sensation on both upper and lower extremities; currently moving and functioning at his baseline

## 2024-08-27 NOTE — CONSULT NOTE ADULT - SUBJECTIVE AND OBJECTIVE BOX
****TEMPLATE ONLY***      Patient is a 74y old  Male who presents with a chief complaint of Acute CVA (27 Aug 2024 10:07)      HPI:  Patient is a 74M with PMHx of HTN, DM, GERD, hip arthritis who presents with 3 month history of chest pain on exertion. Patient is visiting his daughter for Love and arrived to [place] last Saturday. Patient has been having intermittent chest tightness with exertion for the past 3 months. The episodes last 1-3 hours and improves with rest. During these episodes, patient reports palpitations and dizziness but denies SOB, arm pain or numbness. Patient denies pleuritic chest pain, and is not tender to touch. Patient has never sustained a fall or syncopal event. Six weeks ago patient also started having numbness on all 10 of his toes and was told by his PCP that it was likely 2/2 his diabetes. Patient has not seen a cardiologist for the chest pain. He regularly gets an EKG and his last TTE 9 months ago was normal. Patient never had a stress test before. 3 days ago he had an episode of R. neck numbness and pain. Patient denies nausea, vomiting, fever, chills, abdominal pain, shooting pain down the leg.     While in the ED, patient was noted to have a new left facial droop. Per the daughter, patient's speech was unclear at the time. Patient denies facial numbness or enunciating difficulties but did endorse feeling off balance when trying to walk. Speech has improved to near baseline at the time of exam. Patient endorses headache but denies any neurological symptoms including arm or leg weakness, vision changes, focal numbness. Patient reports that he was on cholesterol medication but his recent cholesterol was 174 so he has been intermittently taking his meds. Last A1C was 6.7. Patient denies any family history of stroke or MI.  (26 Aug 2024 23:34)           The patient was last know well at  The patient lives at home/ NH.  The patient walks without assistance/ with a cane or walker    Neurological Review of Systems:  No difficulty with language.  No vision loss or double vision.  No dizziness, vertigo or new hearing loss.  No difficulty with speech or swallowing.  No focal weakness.  No focal sensory changes.  No numbness or tingling in the bilateral lower extremities.  No difficulty with balance.  No difficulty with ambulation.      MEDICATIONS  (STANDING):  aspirin enteric coated 81 milliGRAM(s) Oral daily  atorvastatin 80 milliGRAM(s) Oral at bedtime  clopidogrel Tablet 75 milliGRAM(s) Oral daily  enoxaparin Injectable 40 milliGRAM(s) SubCutaneous every 24 hours  insulin lispro (ADMELOG) corrective regimen sliding scale   SubCutaneous at bedtime  insulin lispro (ADMELOG) corrective regimen sliding scale   SubCutaneous three times a day before meals  melatonin 5 milliGRAM(s) Oral at bedtime  pantoprazole    Tablet 40 milliGRAM(s) Oral before breakfast  polyethylene glycol 3350 17 Gram(s) Oral daily  senna 2 Tablet(s) Oral at bedtime    MEDICATIONS  (PRN):    Allergies    No Known Allergies    Intolerances      PAST MEDICAL & SURGICAL HISTORY:  HTN (hypertension)      T2DM (type 2 diabetes mellitus)      H/O hemorrhoidectomy        FAMILY HISTORY:    SOCIAL HISTORY: non smoker/ former smoker/ active smoker    Review of Systems:  Constitutional: No generalized weakness. No fevers or chills.                    Eyes, Ears, Mouth, Throat: No vision loss   Respiratory: No shortness of breath or cough.                                Cardiovascular: No chest pain or palpitations  Gastrointestinal: No nausea or vomiting.                                         Genitourinary: No urinary incontinence or burning on urination.  Musculoskeletal: No joint pain.                                                           Dermatologic: No rash.  Neurological: as per HPI                                                                      Psychiatric: No behavioral problems.  Endocrine: No known hypoglycemia.               Hematologic/Lymphatic: No easy bleeding.    O:  Vital Signs Last 24 Hrs  T(C): 37.1 (27 Aug 2024 11:04), Max: 37.1 (27 Aug 2024 11:04)  T(F): 98.7 (27 Aug 2024 11:04), Max: 98.7 (27 Aug 2024 11:04)  HR: 69 (27 Aug 2024 11:04) (60 - 69)  BP: 131/78 (27 Aug 2024 11:04) (116/70 - 157/88)  BP(mean): 97 (27 Aug 2024 10:34) (97 - 97)  RR: 18 (27 Aug 2024 11:04) (18 - 20)  SpO2: 99% (27 Aug 2024 11:04) (98% - 100%)    Parameters below as of 27 Aug 2024 11:04  Patient On (Oxygen Delivery Method): room air        General Exam:   General appearance: No acute distress                 Cardiovascular: Pedal dorsalis pulses intact bilaterally    Neurological Exam:  NIH Stroke Scale (NIHSS):   1a. LOConscious:  0-alert 1-lethargy 2-obtund 3-coma:    _____  1b. LOC Questions:  0-both 1-one 2-none                       _____  1c. LOC Commands:  0-both 1-one 2-none                     _____  2.   Gaze:  0-nl 1-partial 2-conjugate                                _____  3.   Visual:  0-nl 1-part kinjal 2-full kinjal 3-bilat kinjal         _____  4.   Facial Palsy:  0-nl 1-minor 2-part 3-complete             _____  5.   Motor Arm:  0-nl 1-drift 2-effort 3-no effort         Left             _____                              4-no move UN-amputated                     Right  _____  6.   Motor Leg:                                                                 Left   _____                                                                                   Right _____  7.   Ataxia:  0-nl 1-one limb 2-two UN-amp                      _____  8.   Sensory:  0-nl 1-mild 2-severe                                  _____  9.   Language:  0-nl 1-mild 2-severe 3-mute                     _____  10.  Dysarthria:  0-nl 1-mild 2-severe 3-barrier                  _____  11.  Extinction/Inattention:  0-nl 1-mild 2-deep                 _____         TOTAL NIHSS       ________    MRS Score:  _____  (MRS Scoring.  No symptoms at all: 0;   No significant disability despite symptoms; able to carry out all usual duties and activities: +1;  Slight disability; unable to carry out all previous activities, but able to look after own affairs without assistance: +2;  Moderate disability; requiring some help, but able to walk without assistance: +3;  Moderately severe disability; unable to walk and attend to bodily needs without assistance: +4;   Severe disability; bedridden, incontinent and requiring constant nursing care and attention:  +5;  Dead: +6)    Mental Status: Orientated to self, date and place.  Attention intact.  No dysarthria, aphasia or neglect.  Knowledge intact.  Registration intact.  Short and long term memory grossly intact.      Cranial Nerves: CN I - not tested.  PERRL, EOMI, VFF, no nystagmus or diplopia.  No APD.  Fundi not visualized bilaterally.  CN V1-3 intact to light touch.  No facial asymmetry.  Hearing intact to finger rub bilaterally.  Tongue, uvula and palate midline.  Sternocleidomastoid and Trapezius intact bilaterally.    Motor:   Tone: normal.                  Strength intact throughout  No pronator drift bilaterally                      No dysmetria on finger-nose-finger or heel-shin-heel  No truncal ataxia.  No resting, postural or action tremor.  No myoclonus.    Sensation: intact to light touch    Deep Tendon Reflexes: 1+ bilateral biceps, triceps, brachioradialis, knee and ankle  Toes flexor bilaterally    Gait: normal and stable.  Rhomberg -jannet.    Other:      LABS:                        12.1   4.93  )-----------( 173      ( 27 Aug 2024 05:40 )             37.1     08-27    139  |  105  |  22<H>  ----------------------------<  148<H>  4.4   |  27  |  1.37<H>    Ca    9.6      27 Aug 2024 05:40  Phos  3.2     08-27  Mg     1.8     08-27    TPro  7.4  /  Alb  3.6  /  TBili  0.6  /  DBili  x   /  AST  34  /  ALT  60  /  AlkPhos  78  08-26      Urinalysis Basic - ( 27 Aug 2024 05:40 )    Color: x / Appearance: x / SG: x / pH: x  Gluc: 148 mg/dL / Ketone: x  / Bili: x / Urobili: x   Blood: x / Protein: x / Nitrite: x   Leuk Esterase: x / RBC: x / WBC x   Sq Epi: x / Non Sq Epi: x / Bacteria: x      LDL  HbA1C    RADIOLOGY & ADDITIONAL STUDIES:    EKG: < from: CT Head No Cont (08.26.24 @ 20:13) >    No intracranial mass effect, acute hemorrhage or midline shift. Punctate   focus of ischemia seen on the MRI of the brain from the same day, in the   left corona radiata, is not appreciated on this noncontrast CT.    --- End of Report ---    < end of copied text >  < from: MR Angio Head No Cont (08.26.24 @ 19:24) >  Punctate focus ofrestricted diffusion in the left corona radiata with   associated T2 prolongation, consistent with an acute infarct. There is no   acute intracranial hemorrhage.    No hemodynamically significant stenosis in the head or neck.    No intracranial aneurysm.    < end of copied text >      Impression:  < from: MR Angio Head No Cont (08.26.24 @ 19:24) >  Punctate focus ofrestricted diffusion in the left corona radiata with   associated T2 prolongation, consistent with an acute infarct. There is no   acute intracranial hemorrhage.    < end of copied text >       Recommendations:  1.             Admit to telemetry for 24hours to evaluate for arrythmias/ Afib.  2.             TTE  4.             Please check HbA1C and fasting lipid profile  5.             Secondary stroke prevention: ASA 81mg (or ASA 325mg rectally if unable to take p) and Lipitor 40mg HS; Plavix x 3 weeks.  If the patient is on anticoagulation, there is no neurological need for ASA or Plavix.  6.             BP goal of normal, don't drop more than 25% in 24 hours  7.                    Formal speech and swallow evaluation  12.          PT evaluation  13.          STAT CTH IF the patient has sudden change in mental status or neurological exam  14.          DVT PPx    Thank you for the courtesy of this consult.         Patient is a 74y old  Male who presents with a chief complaint of Acute CVA (27 Aug 2024 10:07)      HPI:  Patient is a 74M with PMHx of HTN, DM, GERD, hip arthritis who presents with 3 month history of chest pain on exertion neuro called for left sided weakness. Patient is visiting his daughter for Love and arrived to [place] last Saturday. Patient has been having intermittent chest tightness with exertion for the past 3 months. The episodes last 1-3 hours and improves with rest. During these episodes, patient reports palpitations and dizziness but denies SOB, arm pain or numbness. Patient denies pleuritic chest pain, and is not tender to touch. Patient has never sustained a fall or syncopal event. Six weeks ago patient also started having numbness on all 10 of his toes and was told by his PCP that it was likely 2/2 his diabetes. Patient has not seen a cardiologist for the chest pain. He regularly gets an EKG and his last TTE 9 months ago was normal. Patient never had a stress test before. 3 days ago he had an episode of R. neck numbness and pain. Patient denies nausea, vomiting, fever, chills, abdominal pain, shooting pain down the leg.     While in the ED, patient was noted to have a new left facial droop. Per the daughter, patient's speech was unclear at the time. Patient denies facial numbness or enunciating difficulties but did endorse feeling off balance when trying to walk. Speech has improved to near baseline at the time of exam. Patient endorses headache but denies any neurological symptoms including arm or leg weakness, vision changes, focal numbness. Patient reports that he was on cholesterol medication but his recent cholesterol was 174 so he has been intermittently taking his meds. Last A1C was 6.7. Patient denies any family history of stroke or MI.  (26 Aug 2024 23:34)    The patient lives at home.  The patient walks without assistance    Neurological Review of Systems:  No difficulty with language.  No vision loss or double vision.  No dizziness, vertigo or new hearing loss.  No difficulty with  swallowing.   No focal sensory changes.  No difficulty with balance.  No difficulty with ambulation.      MEDICATIONS  (STANDING):  aspirin enteric coated 81 milliGRAM(s) Oral daily  atorvastatin 80 milliGRAM(s) Oral at bedtime  clopidogrel Tablet 75 milliGRAM(s) Oral daily  enoxaparin Injectable 40 milliGRAM(s) SubCutaneous every 24 hours  insulin lispro (ADMELOG) corrective regimen sliding scale   SubCutaneous at bedtime  insulin lispro (ADMELOG) corrective regimen sliding scale   SubCutaneous three times a day before meals  melatonin 5 milliGRAM(s) Oral at bedtime  pantoprazole    Tablet 40 milliGRAM(s) Oral before breakfast  polyethylene glycol 3350 17 Gram(s) Oral daily  senna 2 Tablet(s) Oral at bedtime    MEDICATIONS  (PRN):    Allergies    No Known Allergies    Intolerances      PAST MEDICAL & SURGICAL HISTORY:  HTN (hypertension)      T2DM (type 2 diabetes mellitus)      H/O hemorrhoidectomy        FAMILY HISTORY: nc daughter    SOCIAL HISTORY: non smoker    Review of Systems:  Constitutional: No fevers or chills.                    Eyes, Ears, Mouth, Throat: No vision loss   Respiratory: No cough.                                Cardiovascular: No chest pain   Gastrointestinal: No vomiting.                                         Genitourinary: No  burning on urination.  Musculoskeletal: No joint pain.                                                           Dermatologic: No rash.  Neurological: as per HPI                                                                      Psychiatric: No behavioral problems.  Endocrine: No known hypoglycemia.               Hematologic/Lymphatic: No easy bleeding.    O:  Vital Signs Last 24 Hrs  T(C): 37.1 (27 Aug 2024 11:04), Max: 37.1 (27 Aug 2024 11:04)  T(F): 98.7 (27 Aug 2024 11:04), Max: 98.7 (27 Aug 2024 11:04)  HR: 69 (27 Aug 2024 11:04) (60 - 69)  BP: 131/78 (27 Aug 2024 11:04) (116/70 - 157/88)  BP(mean): 97 (27 Aug 2024 10:34) (97 - 97)  RR: 18 (27 Aug 2024 11:04) (18 - 20)  SpO2: 99% (27 Aug 2024 11:04) (98% - 100%)    Parameters below as of 27 Aug 2024 11:04  Patient On (Oxygen Delivery Method): room air        General Exam:   General appearance: No acute distress                 Cardiovascular: Pedal dorsalis pulses intact bilaterally    Neurological Exam:  NIH Stroke Scale (NIHSS):   1a. LOConscious:  0-alert 1-lethargy 2-obtund 3-coma:    _____  1b. LOC Questions:  0-both 1-one 2-none                       _____  1c. LOC Commands:  0-both 1-one 2-none                     _____  2.   Gaze:  0-nl 1-partial 2-conjugate                                _____  3.   Visual:  0-nl 1-part kinjal 2-full kinjal 3-bilat kinjal         _____  4.   Facial Palsy:  0-nl 1-minor 2-part 3-complete             ___1__  5.   Motor Arm:  0-nl 1-drift 2-effort 3-no effort         Left             _____                              4-no move UN-amputated                     Right  _____  6.   Motor Leg:                                                                 Left   _____                                                                                   Right _____  7.   Ataxia:  0-nl 1-one limb 2-two UN-amp                      _____  8.   Sensory:  0-nl 1-mild 2-severe                                  _____  9.   Language:  0-nl 1-mild 2-severe 3-mute                     _____  10.  Dysarthria:  0-nl 1-mild 2-severe 3-barrier                  _____  11.  Extinction/Inattention:  0-nl 1-mild 2-deep                 _____         TOTAL NIHSS       ____1____    MRS Score:  __0___  (MRS Scoring.  No symptoms at all: 0;   No significant disability despite symptoms; able to carry out all usual duties and activities: +1;  Slight disability; unable to carry out all previous activities, but able to look after own affairs without assistance: +2;  Moderate disability; requiring some help, but able to walk without assistance: +3;  Moderately severe disability; unable to walk and attend to bodily needs without assistance: +4;   Severe disability; bedridden, incontinent and requiring constant nursing care and attention:  +5;  Dead: +6)    Mental Status: Orientated to self, date and place.  Attention intact.  No dysarthria, aphasia or neglect.  Knowledge intact.  Registration intact.  Short and long term memory grossly intact.      Cranial Nerves: CN I - not tested.  PERRL, EOMI, VFF, no nystagmus or diplopia.  No APD.  Fundi not visualized bilaterally.  CN V1-3 intact to light touch.  left facial weakness (1). Hearing intact to finger rub bilaterally.  Tongue, uvula and palate midline.  Sternocleidomastoid and Trapezius intact bilaterally.    Motor:   Tone: normal.                  Strength intact throughout  No pronator drift bilaterally                      No dysmetria on finger-nose-finger or heel-shin-heel  No truncal ataxia.  No resting, postural or action tremor.  No myoclonus.    Sensation: intact to light touch    Deep Tendon Reflexes: 1+ bilateral biceps, triceps, brachioradialis, knee and ankle  Toes flexor bilaterally    Gait: normal and stable.      Other:      LABS:                        12.1   4.93  )-----------( 173      ( 27 Aug 2024 05:40 )             37.1     08-27    139  |  105  |  22<H>  ----------------------------<  148<H>  4.4   |  27  |  1.37<H>    Ca    9.6      27 Aug 2024 05:40  Phos  3.2     08-27  Mg     1.8     08-27    TPro  7.4  /  Alb  3.6  /  TBili  0.6  /  DBili  x   /  AST  34  /  ALT  60  /  AlkPhos  78  08-26      Urinalysis Basic - ( 27 Aug 2024 05:40 )    Color: x / Appearance: x / SG: x / pH: x  Gluc: 148 mg/dL / Ketone: x  / Bili: x / Urobili: x   Blood: x / Protein: x / Nitrite: x   Leuk Esterase: x / RBC: x / WBC x   Sq Epi: x / Non Sq Epi: x / Bacteria: x      LDL  HbA1C    RADIOLOGY & ADDITIONAL STUDIES:    EKG: < from: CT Head No Cont (08.26.24 @ 20:13) >(images reviewed)    No intracranial mass effect, acute hemorrhage or midline shift. Punctate   focus of ischemia seen on the MRI of the brain from the same day, in the   left corona radiata, is not appreciated on this noncontrast CT.    --- End of Report ---    < end of copied text >  < from: MR Angio Head No Cont (08.26.24 @ 19:24) >  Punctate focus ofrestricted diffusion in the left corona radiata with   associated T2 prolongation, consistent with an acute infarct. There is no   acute intracranial hemorrhage.    No hemodynamically significant stenosis in the head or neck.    No intracranial aneurysm.    < end of copied text >

## 2024-08-27 NOTE — PATIENT PROFILE ADULT - FUNCTIONAL ASSESSMENT - DAILY ACTIVITY 3.
4 = No assist / stand by assistance Telephone Encounter by Cheri Martins at 07/10/17 08:47 AM     Author:  Cheri Martins Service:  (none) Author Type:       Filed:  07/10/17 08:48 AM Encounter Date:  7/10/2017 Status:  Signed     :  Cheri Martins ()              YARA TORIBIO    Patient Age: 71 year old   Refill request by:[PH1.1T] Phone.  Patient wants a call back? No[PH1.1M]  Refill to be:[PH1.1T] ePrescribed to[PH1.1M]   Pharmacy     [facility]    [address] [facility] [address]    Phone: [phone][PH1.2T]          Patient notified that it takes up to 72 business hours for a refill, they can just check at the pharmacy and if there are problems filling the office will call them.    Routed to [facility]      Medication requested to be refilled:[PH1.1T]   Requested Prescriptions     Pending Prescriptions Disp Refills   • budesonide-formoterol (SYMBICORT) 160-4.5 MCG/ACT inhaler 3 Inhaler 3[PH1.2T]           Next and Last Visit with Provider and Department  Next visit with SON RICHTER is on 10/25/2017 at  2:50 PM in INTERNAL MEDICINE SEQ  Next visit with INTERNAL MEDICINE is on 10/25/2017 at  2:50 PM in INTER[facility] MEDICINE SEQ   Last visit with SON RICHTER was on 04/26/2017 at  4:10 PM in INTERNAL MEDICINE SEQ  Last visit with INTERNAL MEDICINE was on 04/26/2017 at  4:10 PM in INTERNAL MEDICINE SEQ      WEIGHT AND HEIGHT: As of 04/26/2017 weight is 169 lbs.(76.658 kg).   BMI is 30.44 kg/(m^2) calculated from:     Height 5' 1.75\" (1.568 m) as of 10/19/16     Weight 165 lb (74.844 kg) as of 10/19/16[PH1.1T]      No Known Allergies[PH1.2T]  Current outpatient prescriptions       Medication  Sig Dispense Refill   • alendronate (FOSAMAX) 70 MG tablet TAKE 1 TABLET ONCE WEEKLY. TAKE FIRST THING IN MORNING WITH FULL GLASS OF WATER. DO NOT EAT OR LIE DOWN FOR 30 MINUTES 12 Tab 0   • benazepril (LOTENSIN) 40 MG tablet TAKE 1 TABLET EVERY DAY 90  Tab 0   • albuterol (PROAIR HFA) 108 (90 BASE) MCG/ACT inhaler Inhale 2 Puffs by mouth every 4 (four) hours as needed for Wheezing or Cough. 1 Inhaler 3   • pantoprazole (PROTONIX) 40 MG tablet TAKE 1 TABLET ONE TIME DAILY 90 Tab 3   • pravastatin (PRAVACHOL) 40 MG tablet TAKE 1 TABLET ONE TIME DAILY 90 Tab 1   • hydrochlorothiazide (HYDRODIURIL) 12.5 MG tablet TAKE 1 TABLET EVERY DAY 90 Tab 1   • SYMBICORT 160-4.5 MCG/ACT inhaler INHALE 2 PUFFS BY MOUTH TWO TIMES DAILY. 3 Inhaler 3   • Omega-3 Fatty Acids (FISH OIL OR) Take  by mouth.     • CINNAMON OR Take  by mouth.     • BETA CAROTENE OR Take  by mouth.     • GARLIC OR Take  by mouth.     • Misc Natural Products (TURMERIC CURCUMIN OR) Take  by mouth.     • Bioflavonoid Products (TONY C OR) Take  by mouth.     • Aspirin 81 MG OR TABS 1 TABLET DAILY     • CALCIUM CARBONATE-VIT D--200 MG-UNIT OR TABS Take one by mouth twice daily  60 5        ROUTING:[PH1.1T] Patient's physician/staff[PH1.1M]        PCP: Colton Del Rio MD, PhD         INS: Payor: [employer] / Plan: N/A / Product Type: *No Product type* / Note: This is the primary coverage, but no account was found for this location or the patient's primary location.   ADDRESS:  [address] [place] [id number][PH1.1T]       Revision History        User Key Date/Time User Provider Type Action    > PH1.2 07/10/17 08:48 AM Cheri Martins  Sign     PH1.1 07/10/17 08:47 AM Cheri Martins      M - Manual, T - Template

## 2024-08-27 NOTE — CONSULT NOTE ADULT - ASSESSMENT
Impression:  Acute left corona radiata infarct, lacunar, due to HTN or DM     Recommendations:  1.             Admit to telemetry for 24hours to evaluate for arrythmias/ Afib.  2.             TTE  3.             Please check HbA1C and fasting lipid profile  4.             Secondary stroke prevention: ASA 81mg (or ASA 325mg rectally if unable to take p) and Lipitor 40mg HS; Plavix x 3 weeks.  If the patient is on anticoagulation, there is no neurological need for ASA or Plavix.  5.             BP goal of normal, don't drop more than 25% in 24 hours  6.                    Formal speech and swallow evaluation  7.          PT evaluation  8.          STAT CTH IF the patient has sudden change in mental status or neurological exam  9.          DVT PPx    Thank you for the courtesy of this consult.  time spent 65min

## 2024-08-27 NOTE — PHYSICAL THERAPY INITIAL EVALUATION ADULT - MODIFIED CLINICAL TEST OF SENSORY INTEGRATION IN BALANCE TEST
POSTURAL ASSESSMENT SCALE FOR STROKE (PASS) SCORE: (Maintaining Posture) 15 + (Changing Posture) 21 = (TOTAL SCORE) 36/36

## 2024-08-27 NOTE — PROGRESS NOTE ADULT - PROBLEM SELECTOR PLAN 2
p/w left sided chest pain with nausea   Reports chest pain on exertion for the past 3 months; chest pain last 2-3 months   trop neg x2; EKG NSR,   f/u TTE  Not a candidate for stress test or PCI for 3 months 2/2 acute stroke p/w left sided chest pain with nausea   Reports chest pain on exertion for the past 3 months; chest pain last 2-3 months   trop neg x2; EKG NSR,   TTE completed, awaiting results   Not a candidate for stress test or PCI for 3 months 2/2 acute stroke

## 2024-08-27 NOTE — PHYSICAL THERAPY INITIAL EVALUATION ADULT - COORDINATION ASSESSED, REHAB EVAL
mild overshoot and impaired coordination and dysmetria on left upper and lower extremities/finger to nose/heel to shin

## 2024-08-27 NOTE — PHYSICAL THERAPY INITIAL EVALUATION ADULT - GROSSLY INTACT, SENSORY
(+) numbness on all toes of both feet due to history of peripheral neuropathy as per patient/Grossly Intact

## 2024-08-27 NOTE — PROGRESS NOTE ADULT - SUBJECTIVE AND OBJECTIVE BOX
PGY-1 Progress Note discussed with attending    Jimbo Jacobsen via Teams TILL 5:00 PM  PLEASE CONTACT ON CALL TEAM:  - On Call Team (Please refer to Donte) FROM 5:00 PM - 8:30PM  - Nightfloat Team FROM 8:30 -7:30 AM    CHIEF COMPLAINT & BRIEF HOSPITAL COURSE:    INTERVAL HPI/OVERNIGHT EVENTS:       REVIEW OF SYSTEMS:  CONSTITUTIONAL: No fever, weight loss, or fatigue  RESPIRATORY: No cough, wheezing, chills or hemoptysis; No shortness of breath  CARDIOVASCULAR: No chest pain, palpitations, dizziness, or leg swelling  GASTROINTESTINAL: No abdominal pain. No nausea, vomiting, or hematemesis; No diarrhea or constipation. No melena or hematochezia.  GENITOURINARY: No dysuria or hematuria, urinary frequency  NEUROLOGICAL: No headaches, memory loss, loss of strength, numbness, or tremors  SKIN: No itching, burning, rashes, or lesions     Vital Signs Last 24 Hrs  T(C): 37 (27 Aug 2024 07:24), Max: 37 (27 Aug 2024 07:24)  T(F): 98.6 (27 Aug 2024 07:24), Max: 98.6 (27 Aug 2024 07:24)  HR: 60 (27 Aug 2024 07:24) (60 - 80)  BP: 137/79 (27 Aug 2024 07:24) (116/70 - 184/103)  BP(mean): --  RR: 18 (27 Aug 2024 07:24) (18 - 20)  SpO2: 98% (27 Aug 2024 07:24) (98% - 100%)    Parameters below as of 27 Aug 2024 07:24  Patient On (Oxygen Delivery Method): room air        PHYSICAL EXAMINATION:  GENERAL: NAD, well built  HEAD:  Atraumatic, Normocephalic  EYES:  conjunctiva and sclera clear  NECK: Supple, No JVD, Normal thyroid  CHEST/LUNG: Clear to auscultation. Clear to percussion bilaterally; No rales, rhonchi, wheezing, or rubs  HEART: Regular rate and rhythm; No murmurs, rubs, or gallops  ABDOMEN: Soft, Nontender, Nondistended; Bowel sounds present  NERVOUS SYSTEM:  Alert & Oriented X3,    EXTREMITIES:  2+ Peripheral Pulses, No clubbing, cyanosis, or edema  SKIN: warm dry                          12.1   4.93  )-----------( 173      ( 27 Aug 2024 05:40 )             37.1     08-27    139  |  105  |  22<H>  ----------------------------<  148<H>  4.4   |  27  |  1.37<H>    Ca    9.6      27 Aug 2024 05:40  Phos  3.2     08-27  Mg     1.8     08-27    TPro  7.4  /  Alb  3.6  /  TBili  0.6  /  DBili  x   /  AST  34  /  ALT  60  /  AlkPhos  78  08-26    LIVER FUNCTIONS - ( 26 Aug 2024 13:20 )  Alb: 3.6 g/dL / Pro: 7.4 g/dL / ALK PHOS: 78 U/L / ALT: 60 U/L DA / AST: 34 U/L / GGT: x                   CAPILLARY BLOOD GLUCOSE      RADIOLOGY & ADDITIONAL TESTS:                   PGY-1 Progress Note discussed with attending    Jimbo Jacobsen via Teams TILL 5:00 PM  PLEASE CONTACT ON CALL TEAM:  - On Call Team (Please refer to Donte) FROM 5:00 PM - 8:30PM  - Nightfloat Team FROM 8:30 -7:30 AM    CHIEF COMPLAINT & BRIEF HOSPITAL COURSE:  Patient is a 74M with PMHx of HTN, DM, GERD, hip arthritis who presents with 3 month history of chest pain on exertion. Patient noted to have new left sided facial droop in the ED. NIHSS 1. MR of head showed punctate focus in left corona radiata, consistent with an acute infarct. Admitted for acute CVA workup.     INTERVAL HPI/OVERNIGHT EVENTS:   Patient states facial droop has improved. Patient has no other complaints at this time. Patient examined at bed side in the AM. Plan discussed with patient and family at bed side.       REVIEW OF SYSTEMS:  CONSTITUTIONAL: No fever, weight loss, or fatigue  RESPIRATORY: No cough, wheezing, chills or hemoptysis; No shortness of breath  CARDIOVASCULAR: Mild chest pain (improved) No palpitations, dizziness, or leg swelling  GASTROINTESTINAL: No abdominal pain. No nausea, vomiting, or hematemesis; No diarrhea or constipation. No melena or hematochezia.  GENITOURINARY: No dysuria or hematuria, urinary frequency  NEUROLOGICAL:  Facial droop left side No headaches, memory loss, numbness, or tremors  SKIN: No itching, burning, rashes, or lesions     Vital Signs Last 24 Hrs  T(C): 37 (27 Aug 2024 07:24), Max: 37 (27 Aug 2024 07:24)  T(F): 98.6 (27 Aug 2024 07:24), Max: 98.6 (27 Aug 2024 07:24)  HR: 60 (27 Aug 2024 07:24) (60 - 80)  BP: 137/79 (27 Aug 2024 07:24) (116/70 - 184/103)  BP(mean): --  RR: 18 (27 Aug 2024 07:24) (18 - 20)  SpO2: 98% (27 Aug 2024 07:24) (98% - 100%)    Parameters below as of 27 Aug 2024 07:24  Patient On (Oxygen Delivery Method): room air        PHYSICAL EXAMINATION:  GENERAL: NAD, well built  HEAD:  Atraumatic, Normocephalic  EYES:  conjunctiva and sclera clear  CHEST/LUNG: Clear to auscultation. No rales, rhonchi, wheezing, or rubs  HEART: Regular rate and rhythm; No murmurs, rubs, or gallops  ABDOMEN: Soft, Nontender, Nondistended; Bowel sounds present  NERVOUS SYSTEM:  Alert & Oriented X3, Facial droop left side otherwise no focal neurological deficit   EXTREMITIES:  2+ Peripheral Pulses, No clubbing, cyanosis, or edema  SKIN: warm dry                          12.1   4.93  )-----------( 173      ( 27 Aug 2024 05:40 )             37.1     08-27    139  |  105  |  22<H>  ----------------------------<  148<H>  4.4   |  27  |  1.37<H>    Ca    9.6      27 Aug 2024 05:40  Phos  3.2     08-27  Mg     1.8     08-27    TPro  7.4  /  Alb  3.6  /  TBili  0.6  /  DBili  x   /  AST  34  /  ALT  60  /  AlkPhos  78  08-26    LIVER FUNCTIONS - ( 26 Aug 2024 13:20 )  Alb: 3.6 g/dL / Pro: 7.4 g/dL / ALK PHOS: 78 U/L / ALT: 60 U/L DA / AST: 34 U/L / GGT: x                   CAPILLARY BLOOD GLUCOSE      RADIOLOGY & ADDITIONAL TESTS: MR Head Punctate focus of restricted diffusion in the left corona radiata with   associated T2 prolongation, consistent with an acute infarct. There is no   acute intracranial hemorrhage.

## 2024-08-28 VITALS
OXYGEN SATURATION: 99 % | SYSTOLIC BLOOD PRESSURE: 135 MMHG | TEMPERATURE: 98 F | RESPIRATION RATE: 17 BRPM | DIASTOLIC BLOOD PRESSURE: 83 MMHG | HEART RATE: 66 BPM

## 2024-08-28 DIAGNOSIS — R79.89 OTHER SPECIFIED ABNORMAL FINDINGS OF BLOOD CHEMISTRY: ICD-10-CM

## 2024-08-28 LAB
ANION GAP SERPL CALC-SCNC: 6 MMOL/L — SIGNIFICANT CHANGE UP (ref 5–17)
BUN SERPL-MCNC: 23 MG/DL — HIGH (ref 7–18)
CALCIUM SERPL-MCNC: 9.8 MG/DL — SIGNIFICANT CHANGE UP (ref 8.4–10.5)
CHLORIDE SERPL-SCNC: 105 MMOL/L — SIGNIFICANT CHANGE UP (ref 96–108)
CO2 SERPL-SCNC: 28 MMOL/L — SIGNIFICANT CHANGE UP (ref 22–31)
CREAT SERPL-MCNC: 1.56 MG/DL — HIGH (ref 0.5–1.3)
EGFR: 47 ML/MIN/1.73M2 — LOW
GLUCOSE BLDC GLUCOMTR-MCNC: 126 MG/DL — HIGH (ref 70–99)
GLUCOSE BLDC GLUCOMTR-MCNC: 143 MG/DL — HIGH (ref 70–99)
GLUCOSE SERPL-MCNC: 172 MG/DL — HIGH (ref 70–99)
HCT VFR BLD CALC: 36.8 % — LOW (ref 39–50)
HGB BLD-MCNC: 12.3 G/DL — LOW (ref 13–17)
MAGNESIUM SERPL-MCNC: 1.9 MG/DL — SIGNIFICANT CHANGE UP (ref 1.6–2.6)
MCHC RBC-ENTMCNC: 28.7 PG — SIGNIFICANT CHANGE UP (ref 27–34)
MCHC RBC-ENTMCNC: 33.4 GM/DL — SIGNIFICANT CHANGE UP (ref 32–36)
MCV RBC AUTO: 85.8 FL — SIGNIFICANT CHANGE UP (ref 80–100)
NRBC # BLD: 0 /100 WBCS — SIGNIFICANT CHANGE UP (ref 0–0)
PHOSPHATE SERPL-MCNC: 3.3 MG/DL — SIGNIFICANT CHANGE UP (ref 2.5–4.5)
PLATELET # BLD AUTO: 174 K/UL — SIGNIFICANT CHANGE UP (ref 150–400)
POTASSIUM SERPL-MCNC: 5 MMOL/L — SIGNIFICANT CHANGE UP (ref 3.5–5.3)
POTASSIUM SERPL-SCNC: 5 MMOL/L — SIGNIFICANT CHANGE UP (ref 3.5–5.3)
RBC # BLD: 4.29 M/UL — SIGNIFICANT CHANGE UP (ref 4.2–5.8)
RBC # FLD: 13.3 % — SIGNIFICANT CHANGE UP (ref 10.3–14.5)
SODIUM SERPL-SCNC: 139 MMOL/L — SIGNIFICANT CHANGE UP (ref 135–145)
WBC # BLD: 4.67 K/UL — SIGNIFICANT CHANGE UP (ref 3.8–10.5)
WBC # FLD AUTO: 4.67 K/UL — SIGNIFICANT CHANGE UP (ref 3.8–10.5)

## 2024-08-28 PROCEDURE — 83735 ASSAY OF MAGNESIUM: CPT

## 2024-08-28 PROCEDURE — 85027 COMPLETE CBC AUTOMATED: CPT

## 2024-08-28 PROCEDURE — 84484 ASSAY OF TROPONIN QUANT: CPT

## 2024-08-28 PROCEDURE — 93005 ELECTROCARDIOGRAM TRACING: CPT

## 2024-08-28 PROCEDURE — 96374 THER/PROPH/DIAG INJ IV PUSH: CPT

## 2024-08-28 PROCEDURE — 85025 COMPLETE CBC W/AUTO DIFF WBC: CPT

## 2024-08-28 PROCEDURE — 70450 CT HEAD/BRAIN W/O DYE: CPT | Mod: MC

## 2024-08-28 PROCEDURE — 84132 ASSAY OF SERUM POTASSIUM: CPT

## 2024-08-28 PROCEDURE — 70544 MR ANGIOGRAPHY HEAD W/O DYE: CPT | Mod: MC

## 2024-08-28 PROCEDURE — 82330 ASSAY OF CALCIUM: CPT

## 2024-08-28 PROCEDURE — 80053 COMPREHEN METABOLIC PANEL: CPT

## 2024-08-28 PROCEDURE — 74174 CTA ABD&PLVS W/CONTRAST: CPT | Mod: MC

## 2024-08-28 PROCEDURE — 82803 BLOOD GASES ANY COMBINATION: CPT

## 2024-08-28 PROCEDURE — 83880 ASSAY OF NATRIURETIC PEPTIDE: CPT

## 2024-08-28 PROCEDURE — 71045 X-RAY EXAM CHEST 1 VIEW: CPT

## 2024-08-28 PROCEDURE — 80048 BASIC METABOLIC PNL TOTAL CA: CPT

## 2024-08-28 PROCEDURE — 97162 PT EVAL MOD COMPLEX 30 MIN: CPT

## 2024-08-28 PROCEDURE — 93306 TTE W/DOPPLER COMPLETE: CPT

## 2024-08-28 PROCEDURE — 99239 HOSP IP/OBS DSCHRG MGMT >30: CPT | Mod: GC

## 2024-08-28 PROCEDURE — 99285 EMERGENCY DEPT VISIT HI MDM: CPT

## 2024-08-28 PROCEDURE — 70551 MRI BRAIN STEM W/O DYE: CPT | Mod: MC

## 2024-08-28 PROCEDURE — 82962 GLUCOSE BLOOD TEST: CPT

## 2024-08-28 PROCEDURE — 80061 LIPID PANEL: CPT

## 2024-08-28 PROCEDURE — 83690 ASSAY OF LIPASE: CPT

## 2024-08-28 PROCEDURE — 71275 CT ANGIOGRAPHY CHEST: CPT | Mod: MC

## 2024-08-28 PROCEDURE — 84100 ASSAY OF PHOSPHORUS: CPT

## 2024-08-28 PROCEDURE — 83036 HEMOGLOBIN GLYCOSYLATED A1C: CPT

## 2024-08-28 PROCEDURE — 70547 MR ANGIOGRAPHY NECK W/O DYE: CPT | Mod: MC

## 2024-08-28 PROCEDURE — 83605 ASSAY OF LACTIC ACID: CPT

## 2024-08-28 PROCEDURE — 84295 ASSAY OF SERUM SODIUM: CPT

## 2024-08-28 PROCEDURE — 36415 COLL VENOUS BLD VENIPUNCTURE: CPT

## 2024-08-28 RX ORDER — RAMIPRIL 10 MG
1 CAPSULE ORAL
Refills: 0 | DISCHARGE

## 2024-08-28 RX ORDER — ASPIRIN 81 MG
1 TABLET, DELAYED RELEASE (ENTERIC COATED) ORAL
Qty: 30 | Refills: 0
Start: 2024-08-28 | End: 2024-09-26

## 2024-08-28 RX ADMIN — ENOXAPARIN SODIUM 40 MILLIGRAM(S): 100 INJECTION SUBCUTANEOUS at 05:52

## 2024-08-28 RX ADMIN — Medication 40 MILLIGRAM(S): at 05:52

## 2024-08-28 RX ADMIN — Medication 81 MILLIGRAM(S): at 12:41

## 2024-08-28 RX ADMIN — Medication 75 MILLIGRAM(S): at 12:42

## 2024-08-28 NOTE — DISCHARGE NOTE PROVIDER - HOSPITAL COURSE
The patient is a 74 year old male with PMH significant for HTN, T2DM, GERD and arthritis of the hips presented with a 3 month history of The patient is a 74 year old male with PMH significant for HTN, T2DM, GERD and arthritis of the hips presented to the ED with a 3 month history of chest pain.    In the ED, the patient was found to have left sided facial droop. MRI of the head showed punctate focus in the left corona radiata, consistent with infarct. CT head without contrast showed no bleeding. In the setting of chest pain, EKG showed normal sinus rhythm without signs of ischemia. Repeated Troponin was within normal limits. CTA of the chest and abdomen was within normal limits. The patient was started on Aspirin 81mg, Plavix 75mg and Atorvastatin 80mg.    After admission, the facial droop had significantly improved and the patient was seen by Neurology. TTE and Lipid panel were completed and within normal limits. The patient was evaluated by PT who found the patient to have returned to baseline. The patient was also evaluated by Speech pathology who found the patient to not have need for additional therapy.    In the setting of known T2DM, the patient was started on ISS scale. The patient's HbA1c was mildly elevated at 7.4. Upon discharge, patient will be continued on his home regimen of Metformin and is recommended to follow up with his PCP to verify glycemic control.    In the setting of known HTN, the patient remained normo- to mildly hypertensive throughout his hospital stay. Given the patient's self reported episodes of hypotension with dizziness  on Ramipril 5mg at home, blood pressure medication was held. Outpatient followed up with his PCP within one week of discharge is strongly encouraged.    Given the patient's history of GERD, the patient was continued on his home regimen of Omeprazole 20mg.    Patient hemodynamically stable and ready for discharge. This is only a summary of the hospital course, pleas refer to the chart for additional details. The patient is a 74 year old male with PMH significant for HTN, T2DM, GERD and arthritis of the hips presented to the ED with a 3 month history of chest pain.    In the ED, the patient was found to have left sided facial droop. MRI of the head showed punctate focus in the left corona radiata, consistent with infarct. CT head without contrast showed no bleeding. In the setting of chest pain, EKG showed normal sinus rhythm without signs of ischemia. Repeated Troponin was within normal limits. CTA of the chest and abdomen was within normal limits. The patient was started on Aspirin 81mg, Plavix 75mg and Atorvastatin 80mg.    After admission, the facial droop had significantly improved and the patient was seen by Neurology. TTE and Lipid panel were completed and within normal limits. The patient was evaluated by PT who found the patient to have returned to baseline. The patient was also evaluated by Speech pathology who found the patient to not have need for additional therapy. Telemetry monitoring revealed sinus rhythm throughout.    In the setting of known T2DM, the patient was started on an insulin sliding scale. The patient's HbA1c was mildly elevated at 7.4 and blood glucose measurements were mildly hyperglycemic throughout. Upon discharge, patient will be continued on his home regimen of Metformin and is recommended to follow up with his PCP to verify glycemic control.    In the setting of known HTN, the patient remained normo- to mildly hypertensive throughout his hospital stay. Given the patient's self reported episodes of hypotension with dizziness  on Ramipril 5mg at home, blood pressure medication was held. Outpatient followed up with his PCP within one week of discharge is strongly encouraged.    Given the patient's history of GERD, the patient was continued on his home regimen of Omeprazole 20mg.    Patient hemodynamically stable and ready for discharge. This is only a summary of the hospital course, please refer to the chart for additional details. The patient is a 74 year old male with PMH significant for HTN, T2DM, GERD and arthritis of the hips presented to the ED with a 3 month history of chest pain.    In the ED, the patient was found to have left sided facial droop. MRI of the head showed punctate focus in the left corona radiata, consistent with infarct. CT head without contrast showed no bleeding. In the setting of chest pain, EKG showed normal sinus rhythm without signs of ischemia. Repeated Troponin was within normal limits. CTA of the chest and abdomen was within normal limits. The patient was started on Aspirin 81mg, Plavix 75mg and Atorvastatin 80mg.    After admission, the facial droop had significantly improved and the patient was seen by Neurology. TTE and Lipid panel were completed and within normal limits. The patient was evaluated by PT who found the patient to have returned to baseline. The patient was also evaluated by Speech pathology who found the patient to not have need for additional therapy. Telemetry monitoring revealed sinus rhythm throughout. Due to his stroke, the patient is not a candidate for PCI or stress testing as per Cardiology.    In the setting of known T2DM, the patient was started on an insulin sliding scale. The patient's HbA1c was mildly elevated at 7.4 and blood glucose measurements were mildly hyperglycemic throughout. Upon discharge, patient will be continued on his home regimen of Metformin and is recommended to follow up with his PCP to verify glycemic control.    In the setting of known HTN, the patient remained normo- to mildly hypertensive throughout his hospital stay. Given the patient's self reported episodes of hypotension with dizziness  on Ramipril 5mg at home, blood pressure medication was held. Outpatient followed up with his PCP within one week of discharge is strongly encouraged.    Given the patient's history of GERD, the patient was continued on his home regimen of Omeprazole 20mg.    Patient hemodynamically stable and ready for discharge. This is only a summary of the hospital course, please refer to the chart for additional details. The patient is a 74 year old male with PMH significant for HTN, T2DM, GERD and arthritis of the hips presented to the ED with a 3 month history of chest pain.    In the ED, the patient was found to have left sided facial droop. MRI of the head showed punctate focus in the left corona radiata, consistent with infarct. CT head without contrast showed no bleeding. In the setting of chest pain, EKG showed normal sinus rhythm without signs of ischemia. Repeated Troponin was within normal limits. CTA of the chest and abdomen was within normal limits. The patient was started on Aspirin 81mg, Plavix 75mg and Atorvastatin 80mg.    After admission, the facial droop had significantly improved and the patient was seen by Neurology. TTE and Lipid panel were completed and within normal limits. The patient was evaluated by PT who found the patient to have returned to baseline. The patient was also evaluated by Speech pathology who found the patient to not have need for additional therapy. Telemetry monitoring revealed sinus rhythm throughout. Due to his stroke, the patient is not a candidate for PCI or stress testing as per Cardiology.    During the admission, the patient was found to have elevated creatinine. Outpatient workup for CKD is recommended.    In the setting of known T2DM, the patient was started on an insulin sliding scale. The patient's HbA1c was mildly elevated at 7.4 and blood glucose measurements were mildly hyperglycemic throughout. Upon discharge, patient will be continued on his home regimen of Metformin and is recommended to follow up with his PCP to verify glycemic control.    In the setting of known HTN, the patient remained normo- to mildly hypertensive throughout his hospital stay. Given the patient's self reported episodes of hypotension with dizziness  on Ramipril 5mg at home, blood pressure medication was held. Outpatient followed up with his PCP within one week of discharge is strongly encouraged.    Given the patient's history of GERD, the patient was continued on his home regimen of Omeprazole 20mg.    Patient hemodynamically stable and ready for discharge. This is only a summary of the hospital course, please refer to the chart for additional details.

## 2024-08-28 NOTE — DISCHARGE NOTE NURSING/CASE MANAGEMENT/SOCIAL WORK - PATIENT PORTAL LINK FT
You can access the FollowMyHealth Patient Portal offered by [facility] by registering at the following website: [email or web address]. By joining [facility]’s FollowMyHealth portal, you will also be able to view your health information using other applications (apps) compatible with our system.

## 2024-08-28 NOTE — DISCHARGE NOTE PROVIDER - ATTENDING DISCHARGE PHYSICAL EXAMINATION:
GENERAL: NAD, well built  HEAD:  Atraumatic, Normocephalic  EYES:  conjunctiva and sclera clear  CHEST/LUNG: Clear to auscultation. No rales, rhonchi, wheezing, or rubs  HEART: Regular rate and rhythm; No murmurs, rubs, or gallops  ABDOMEN: Soft, Nontender, Nondistended; Bowel sounds present  NERVOUS SYSTEM:  Alert & Oriented X3, Facial droop left side otherwise no focal neurological deficit   EXTREMITIES:  2+ Peripheral Pulses, No clubbing, cyanosis, or edema  SKIN: warm dry

## 2024-08-28 NOTE — DISCHARGE NOTE NURSING/CASE MANAGEMENT/SOCIAL WORK - NSDCPEFALRISK_GEN_ALL_CORE
For information on Fall & Injury Prevention, visit: [email or web address] OR  [email or web address] OR  https://www.cdc.gov/steadi/patient.html

## 2024-08-28 NOTE — DISCHARGE NOTE PROVIDER - NSDCMRMEDTOKEN_GEN_ALL_CORE_FT
metFORMIN 500 mg oral tablet: 1 tab(s) orally 3 times a day  omeprazole 20 mg oral delayed release tablet: 1 tab(s) orally once a day  ramipril 5 mg oral tablet: 1 tab(s) orally once a day   metFORMIN 500 mg oral tablet: 1 tab(s) orally 3 times a day  omeprazole 20 mg oral delayed release tablet: 1 tab(s) orally once a day   aspirin 81 mg oral delayed release tablet: 1 tab(s) orally once a day  atorvastatin 80 mg oral tablet: 1 tab(s) orally once a day (at bedtime)  clopidogrel 75 mg oral tablet: 1 tab(s) orally once a day  metFORMIN 500 mg oral tablet: 1 tab(s) orally 3 times a day  omeprazole 20 mg oral delayed release tablet: 1 tab(s) orally once a day

## 2024-08-28 NOTE — DISCHARGE NOTE PROVIDER - NSDCCPCAREPLAN_GEN_ALL_CORE_FT
PRINCIPAL DISCHARGE DIAGNOSIS  Diagnosis: Acute ischemic stroke  Assessment and Plan of Treatment: You were found to have facial drooping (inability to move the left side of your face) in the ED and had a MR of your brain and neck. You were found to have a new infarct. A CT of your brain ruled out active bleeding. You were started on Aspirin, Plavix and Atorvastatin. Blood work revealed a normal lipid panel but a mildly abnormal long term blood glucose levels. You were seen by a Neurologist. A TTE (ultrasound exam of your heart) revealed no abnormalities. You were seen by Physical Therapy and Speech Therapy who found that you had returned to your baseline level of function and did not require additional treatment.  Please continue to take your Aspirin, Plavix and Atorvastatin and follow up with your PCP within one week of discharge.     PRINCIPAL DISCHARGE DIAGNOSIS  Diagnosis: Acute ischemic stroke  Assessment and Plan of Treatment: You were found to have facial drooping (inability to move the left side of your face) in the ED and had a MR of your brain and neck. You were found to have a new infarct. A CT of your brain ruled out active bleeding. You were started on Aspirin, Plavix and Atorvastatin. Blood work revealed a normal lipid panel but a mildly abnormal long term blood glucose levels. You were seen by a Neurologist. A TTE (ultrasound exam of your heart) revealed no abnormalities. You were seen by Physical Therapy and Speech Therapy who found that you had returned to your baseline level of function and did not require additional treatment.  Please continue to take your Aspirin, Plavix and Atorvastatin and follow up with your PCP within one week of discharge.      SECONDARY DISCHARGE DIAGNOSES  Diagnosis: T2DM (type 2 diabetes mellitus)  Assessment and Plan of Treatment: You have diabetes. You were found to have a A1c of 7.4. Please continue to take your metformin at home and follow up with your PCP for further diabetes management